# Patient Record
Sex: FEMALE | Race: WHITE | Employment: UNEMPLOYED | URBAN - METROPOLITAN AREA
[De-identification: names, ages, dates, MRNs, and addresses within clinical notes are randomized per-mention and may not be internally consistent; named-entity substitution may affect disease eponyms.]

---

## 2017-06-03 ENCOUNTER — HOSPITAL ENCOUNTER (EMERGENCY)
Age: 37
Discharge: HOME | End: 2017-06-03
Attending: EMERGENCY MEDICINE
Payer: MEDICAID

## 2017-06-03 VITALS
OXYGEN SATURATION: 99 % | RESPIRATION RATE: 14 BRPM | BODY MASS INDEX: 47.12 KG/M2 | TEMPERATURE: 98.7 F | HEART RATE: 66 BPM | WEIGHT: 240 LBS | HEIGHT: 60 IN | SYSTOLIC BLOOD PRESSURE: 138 MMHG | DIASTOLIC BLOOD PRESSURE: 60 MMHG

## 2017-06-03 DIAGNOSIS — S03.2XXA AVULSED TOOTH, INITIAL ENCOUNTER: Primary | ICD-10-CM

## 2017-06-03 PROCEDURE — 99283 EMERGENCY DEPT VISIT LOW MDM: CPT

## 2017-06-03 PROCEDURE — 74011250637 HC RX REV CODE- 250/637: Performed by: PHYSICIAN ASSISTANT

## 2017-06-03 PROCEDURE — 74011000250 HC RX REV CODE- 250: Performed by: PHYSICIAN ASSISTANT

## 2017-06-03 RX ORDER — TRAMADOL HYDROCHLORIDE 50 MG/1
50 TABLET ORAL
Qty: 8 TAB | Refills: 0 | Status: SHIPPED | OUTPATIENT
Start: 2017-06-03 | End: 2017-08-01

## 2017-06-03 RX ORDER — PENICILLIN V POTASSIUM 500 MG/1
500 TABLET, FILM COATED ORAL 4 TIMES DAILY
Qty: 40 TAB | Refills: 0 | Status: SHIPPED | OUTPATIENT
Start: 2017-06-03 | End: 2017-06-13

## 2017-06-03 RX ORDER — PENICILLIN V POTASSIUM 500 MG/1
500 TABLET, FILM COATED ORAL 4 TIMES DAILY
Qty: 40 TAB | Refills: 0 | Status: SHIPPED | OUTPATIENT
Start: 2017-06-03 | End: 2017-06-03

## 2017-06-03 RX ADMIN — LIDOCAINE HYDROCHLORIDE: 20 SOLUTION ORAL; TOPICAL at 21:43

## 2017-06-04 NOTE — ED TRIAGE NOTES
Pt arrived to ED with c/o dental pain since last night. Pt was at a family cookout when she bit into a \"bone\" and \"broke\" her tooth. Pt is alert and orientated X 4; skin is intact; lungs are clear; pt breaths well on room air; Pt is in no acute distress. Will continue to monitor.

## 2017-06-04 NOTE — DISCHARGE INSTRUCTIONS
Broken Tooth: Care Instructions  Your Care Instructions  A tooth can be chipped, broken, or knocked out during sports, an accident, or a bad fall. Your doctor may have fixed your tooth temporarily. You also may have been given pain medicine. If you had signs of infection, you may need to take antibiotics. You will need to see a dentist. If you have chipped a tooth, it may be jagged, which can irritate your mouth and tongue. The dentist may smooth the edges and fill in the part that chipped off. A permanent tooth that has been knocked out can be put back in (reimplanted) if it is done quickly. The dentist may need to put a crown on a broken tooth to cover the tooth and hold it together. Prompt dental treatment can often prevent infection in the tooth. Follow-up care is a key part of your treatment and safety. Be sure to make and go to all appointments, and call your doctor if you are having problems. It's also a good idea to know your test results and keep a list of the medicines you take. How can you care for yourself at home? · If your tooth pulp is exposed, you can protect it by putting temporary filling material over the broken area. You can buy temporary filling mixes in drugstores. Follow the directions on the label. · To relieve pain and swelling, put ice or a cold cloth on the tooth's gum or cheek area, or suck on a piece of ice. But if the tooth's nerve or pulp is exposed, avoid putting anything too hot or cold near the tooth until you see your dentist.  · Ask your doctor if you can take an over-the-counter pain medicine, such as acetaminophen (Tylenol), ibuprofen (Advil, Motrin), or naproxen (Aleve). Be safe with medicines. Read and follow all instructions on the label. · If your doctor prescribed antibiotics, take them as directed. Do not stop taking them just because you feel better. You need to take the full course of antibiotics.   · To help healing, rinse your mouth with warm salt water right after meals. To make a saltwater solution, mix 1 teaspoon of salt in 1 cup of warm water. · Eat soft foods that are easy to chew. · Avoid foods that might sting, such as salty or spicy foods, citrus fruits, and tomatoes. · Do not smoke or use spit tobacco. Tobacco can slow healing in your mouth. If you need help quitting, talk to your doctor about stop-smoking programs and medicines. These can increase your chances of quitting for good. · If your tooth is loose, be gentle when you brush or floss. But be sure to brush your teeth at least two times a day, and floss at least once a day. When should you call for help? Call your doctor now or seek immediate medical care if:  · You have signs of infection, such as:  ¨ Increased pain or swelling in your mouth. ¨ Red streaks leading from the gum tissue around the tooth. ¨ Pus draining from the area around the tooth. ¨ A fever. · You have pain and swelling after chipping or breaking a tooth. · You have bleeding near a tooth. · You are not able to open or close your mouth normally. Watch closely for changes in your health, and be sure to contact your doctor if:  · Your tooth is sensitive to heat, cold, air, or sweets. · You do not get better as expected. Where can you learn more? Go to http://cherise-nir.info/. Enter W162 in the search box to learn more about \"Broken Tooth: Care Instructions. \"  Current as of: August 9, 2016  Content Version: 11.2  © 6521-1547 [employer]. Care instructions adapted under license by [employer] (which disclaims liability or warranty for this information). If you have questions about a medical condition or this instruction, always ask your healthcare professional. [place] [address] any warranty or liability for your use of this information.

## 2017-06-04 NOTE — ED PROVIDER NOTES
Patient is a 39 y.o. female presenting with dental problem. The history is provided by the patient. Dental Pain    This is a new problem. Episode onset: Pt reports she bit into a bone last night and her tooth broke. Pt reports increased pain since. Denies fever/chills, trouble swallowing. The pain is located in the left lower mouth. The pain is at a severity of 10/10. The pain is severe. There was no vomiting, no nausea, no fever, no swelling, no chest pain, no shortness of breath, no headaches, no gum redness and no drainage. She has tried nothing for the symptoms. The patient has no cardiac history. 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    Irregular menses        Past Surgical History:   Procedure Laterality Date    HX GYN      HX LAP CHOLECYSTECTOMY      HX ORTHOPAEDIC      HX TONSILLECTOMY           Family History:   Problem Relation Age of Onset    Heart Attack Mother     Stroke Mother     Hypertension Sister     High Cholesterol Sister     Diabetes Sister     Migraines Sister     Cancer Paternal Grandfather     HIV/AIDS Neg Hx        Social History     Social History    Marital status: SINGLE     Spouse name: N/A    Number of children: N/A    Years of education: N/A     Occupational History    Not on file. Social History Main Topics    Smoking status: Former Smoker     Years: 2.00     Quit date: 2/16/2007    Smokeless tobacco: Never Used    Alcohol use No    Drug use: No    Sexual activity: Yes     Partners: Male     Other Topics Concern    Not on file     Social History Narrative         ALLERGIES: Review of patient's allergies indicates no known allergies. Review of Systems   Constitutional: Negative for chills and fever. HENT: Positive for dental problem.  Negative for congestion, ear pain, facial swelling, rhinorrhea, sinus pressure, sore throat and trouble swallowing. Eyes: Negative for photophobia and visual disturbance. Respiratory: Negative for cough and shortness of breath. Cardiovascular: Negative for chest pain. Gastrointestinal: Negative for abdominal pain, nausea and vomiting. Genitourinary: Negative for flank pain. Musculoskeletal: Negative for back pain and myalgias. Skin: Negative for color change, pallor, rash and wound. Neurological: Negative for dizziness, syncope, weakness, light-headedness and headaches. All other systems reviewed and are negative. Vitals:    06/03/17 2106   BP: 138/60   Pulse: 66   Resp: 14   Temp: 98.7 °F (37.1 °C)   SpO2: 99%   Weight: 108.9 kg (240 lb)   Height: 5' (1.524 m)            Physical Exam   Constitutional: She is oriented to person, place, and time. She appears well-developed and well-nourished. No distress. HENT:   Head: Normocephalic and atraumatic. Right Ear: Tympanic membrane, external ear and ear canal normal.   Left Ear: Tympanic membrane, external ear and ear canal normal.   Nose: No mucosal edema or rhinorrhea. Mouth/Throat: Uvula is midline, oropharynx is clear and moist and mucous membranes are normal. No trismus in the jaw. Abnormal dentition. Dental caries present. No dental abscesses or uvula swelling. No oropharyngeal exudate, posterior oropharyngeal edema, posterior oropharyngeal erythema or tonsillar abscesses. Eyes: Conjunctivae are normal.   Cardiovascular: Normal rate, regular rhythm and normal heart sounds. Pulmonary/Chest: Effort normal and breath sounds normal. No respiratory distress. Abdominal: Soft. Bowel sounds are normal. She exhibits no distension. Musculoskeletal: Normal range of motion. Neurological: She is alert and oriented to person, place, and time. Skin: Skin is warm. No rash noted. Psychiatric: She has a normal mood and affect. Her behavior is normal.   Nursing note and vitals reviewed.        MDM  Number of Diagnoses or Management Options  Avulsed tooth, initial encounter:   Diagnosis management comments: DDx: Avulsed Tooth, Dental Pain vs Infection vs Abscess    ED Course       Procedures             LABORATORY TESTS:  No results found for this or any previous visit (from the past 12 hour(s)). IMAGING RESULTS:  No orders to display       MEDICATIONS GIVEN:  Medications   dental ball (lidocaine/Benadryl/Cetacaine) mixture (not administered)       IMPRESSION:  1. Avulsed tooth, initial encounter        PLAN:  1. Current Discharge Medication List      START taking these medications    Details   penicillin v potassium (VEETID) 500 mg tablet Take 1 Tab by mouth four (4) times daily for 10 days. Qty: 40 Tab, Refills: 0      traMADol (ULTRAM) 50 mg tablet Take 1 Tab by mouth every six (6) hours as needed for Pain. Max Daily Amount: 200 mg. Qty: 8 Tab, Refills: 0           2.    Follow-up Information     Follow up With Details Comments [id number] Mike Saunders MD Schedule an appointment as soon as possible for a visit in 2 days As needed [address] [place] [address]  [phone]          Return to ED if worse

## 2017-06-04 NOTE — ED NOTES

## 2017-08-01 ENCOUNTER — HOSPITAL ENCOUNTER (OUTPATIENT)
Dept: GENERAL RADIOLOGY | Age: 37
Discharge: HOME | End: 2017-08-01
Payer: MEDICAID

## 2017-08-01 ENCOUNTER — OFFICE VISIT (OUTPATIENT)
Dept: FAMILY MEDICINE CLINIC | Age: 37
End: 2017-08-01

## 2017-08-01 VITALS
SYSTOLIC BLOOD PRESSURE: 123 MMHG | RESPIRATION RATE: 20 BRPM | TEMPERATURE: 98.3 F | BODY MASS INDEX: 48.33 KG/M2 | DIASTOLIC BLOOD PRESSURE: 62 MMHG | HEART RATE: 66 BPM | HEIGHT: 60 IN | WEIGHT: 246.2 LBS

## 2017-08-01 DIAGNOSIS — M54.42 ACUTE BILATERAL LOW BACK PAIN WITH BILATERAL SCIATICA: Primary | ICD-10-CM

## 2017-08-01 DIAGNOSIS — M54.41 ACUTE BILATERAL LOW BACK PAIN WITH BILATERAL SCIATICA: ICD-10-CM

## 2017-08-01 DIAGNOSIS — F33.1 DEPRESSION, MAJOR, RECURRENT, MODERATE (HCC): ICD-10-CM

## 2017-08-01 DIAGNOSIS — L65.9 ALOPECIA: ICD-10-CM

## 2017-08-01 DIAGNOSIS — F43.9 STRESS: ICD-10-CM

## 2017-08-01 DIAGNOSIS — M54.42 ACUTE BILATERAL LOW BACK PAIN WITH BILATERAL SCIATICA: ICD-10-CM

## 2017-08-01 DIAGNOSIS — G43.009 MIGRAINE WITHOUT AURA AND WITHOUT STATUS MIGRAINOSUS, NOT INTRACTABLE: ICD-10-CM

## 2017-08-01 DIAGNOSIS — M54.41 ACUTE BILATERAL LOW BACK PAIN WITH BILATERAL SCIATICA: Primary | ICD-10-CM

## 2017-08-01 DIAGNOSIS — F41.9 ANXIETY: ICD-10-CM

## 2017-08-01 PROCEDURE — 72100 X-RAY EXAM L-S SPINE 2/3 VWS: CPT

## 2017-08-01 RX ORDER — METHOCARBAMOL 500 MG/1
500 TABLET, FILM COATED ORAL
Qty: 40 TAB | Refills: 1 | OUTPATIENT
Start: 2017-08-01 | End: 2019-10-22

## 2017-08-01 RX ORDER — NAPROXEN 500 MG/1
500 TABLET ORAL 2 TIMES DAILY WITH MEALS
Qty: 40 TAB | Refills: 0 | OUTPATIENT
Start: 2017-08-01 | End: 2019-10-22

## 2017-08-01 RX ORDER — DULOXETIN HYDROCHLORIDE 30 MG/1
CAPSULE, DELAYED RELEASE ORAL
Qty: 60 CAP | Refills: 0 | Status: SHIPPED | OUTPATIENT
Start: 2017-08-01 | End: 2020-01-09 | Stop reason: ALTCHOICE

## 2017-08-01 NOTE — PROGRESS NOTES
HISTORY OF PRESENT ILLNESS  Adelso Thomas is a 39 y.o. female. Headache    The history is provided by the patient (she was told she has migraines 4-5 years ago). This is a chronic problem. Episode onset: about a year ago. Episode frequency: daily. The headache is aggravated by photophobia, loud noise, visual complaints and nausea (seeing sparkles). The pain is located in the right unilateral region. The quality of the pain is described as sharp and throbbing (stabbing). Pain scale: 4/10 - 10/10. Associated symptoms include visual change and nausea. Pertinent negatives include no fever, no weakness, no tingling and no vomiting. She has tried NSAIDs for the symptoms. The treatment provided mild relief. Back Pain    The history is provided by the patient (no history of trauma). This is a new problem. Episode onset: about 2 weeks ago. The problem has been gradually worsening. The problem occurs constantly. The pain is associated with no known injury. The pain is present in the lower back and left side. The quality of the pain is described as aching (throbbing). The pain radiates to the left foot and right foot. Exacerbated by: putting pressure on the area. Associated symptoms include headaches. Pertinent negatives include no fever, no numbness, no bowel incontinence, no bladder incontinence, no tingling and no weakness. She has tried NSAIDs and heat for the symptoms. The treatment provided no relief. Risk factors include obesity, lack of exercise and a sedentary lifestyle. The patient's surgical history non-contributory   Alopecia   The history is provided by the patient (she has been stressed. She is breaking up with her , losing her home, having to go live with her family in [place]). This is a new problem. Episode onset: about 2 weeks ago. Associated symptoms include headaches. Review of Systems   Constitutional: Negative for chills and fever.    HENT:        Phonophobia   Eyes: Positive for blurred vision and photophobia. Negative for double vision. Gastrointestinal: Positive for nausea. Negative for bowel incontinence and vomiting. No bowel incontinence   Genitourinary: Negative for bladder incontinence. No bladder incontinence   Musculoskeletal: Positive for back pain. Negative for falls. Neurological: Positive for headaches. Negative for tingling, sensory change, speech change, focal weakness, weakness and numbness. Psychiatric/Behavioral: Positive for depression. Negative for substance abuse and suicidal ideas. The patient is nervous/anxious and has insomnia. Visit Vitals    /62 (BP 1 Location: Left arm, BP Patient Position: Sitting)    Pulse 66    Temp 98.3 °F (36.8 °C) (Oral)    Resp 20    Ht 5' (1.524 m)    Wt 246 lb 3.2 oz (111.7 kg)    LMP 05/29/2017 (Approximate)    BMI 48.08 kg/m2     Physical Exam   Constitutional: She is oriented to person, place, and time. She appears well-developed and well-nourished. No distress. Eyes: Conjunctivae and EOM are normal. Pupils are equal, round, and reactive to light. Fundoscopic exam:       The right eye shows no papilledema. The left eye shows no papilledema. Cardiovascular: Normal rate, regular rhythm and normal heart sounds. Exam reveals no gallop and no friction rub. No murmur heard. Pulmonary/Chest: Effort normal and breath sounds normal. No respiratory distress. She has no wheezes. She has no rales. Musculoskeletal:        Lumbar back: She exhibits tenderness, bony tenderness and pain. She exhibits normal range of motion and no spasm. The entire low back and buttocks regions are tender. There is tenderness over L1 - L4. Neurological: She is alert and oriented to person, place, and time. She has normal strength. No cranial nerve deficit or sensory deficit. Coordination and gait normal. She displays no Babinski's sign on the right side. She displays no Babinski's sign on the left side.    Reflex Scores:       Tricep reflexes are 2+ on the right side and 2+ on the left side. Bicep reflexes are 2+ on the right side and 2+ on the left side. Brachioradialis reflexes are 2+ on the right side and 2+ on the left side. Patellar reflexes are 2+ on the right side and 2+ on the left side. Achilles reflexes are 2+ on the right side and 2+ on the left side. Negative SLRs   Skin: Skin is warm and dry. She is not diaphoretic. Psychiatric: Her speech is normal. Thought content normal. She exhibits a depressed mood. tearful       ASSESSMENT and PLAN    ICD-10-CM ICD-9-CM    1. Acute bilateral low back pain with bilateral sciatica M54.42 724.2 naproxen (NAPROSYN) 500 mg tablet    M54.41 724.3 methocarbamol (ROBAXIN) 500 mg tablet      CANCELED: XR SPINE LUMB MIN 4 V   2. Migraine without aura and without status migrainosus, not intractable G43.009 346.10    3. Stress F43.9 V62.89    4. Depression, major, recurrent, moderate (HCC) F33.1 296.32 DULoxetine (CYMBALTA) 30 mg capsule   5. Anxiety F41.9 300.00 DULoxetine (CYMBALTA) 30 mg capsule   6. Alopecia L65.9 704.00         Likely back strain  Migraine, likely related to depression, anxiety and stress  Stress related alopecia  Motrin, Robaxin, heat, rest prn back pain  L spine x-ray  Start Cymbalta    Follow-up Disposition:  Return in about 1 month (around 9/1/2017) for migraines, depression, anxiety. Reviewed plan of care. Patient has provided input and agrees with goals.

## 2017-08-01 NOTE — MR AVS SNAPSHOT
Visit Information Date & Time Provider Department Dept. Phone Encounter #  
 8/1/2017  2:00 PM Shell DowneyButch 34 [id number] Follow-up Instructions Return in about 1 month (around 9/1/2017) for migraines, depression, anxiety. Upcoming Health Maintenance Date Due DTaP/Tdap/Td series (1 - Tdap) 8/23/2000 PAP AKA CERVICAL CYTOLOGY 8/23/2000 INFLUENZA AGE 9 TO ADULT 8/1/2017 Allergies as of 8/1/2017  Review Complete On: 8/1/2017 By: Shell Downey MD  
 No Known Allergies Current Immunizations  Never Reviewed No immunizations on file. Not reviewed this visit You Were Diagnosed With   
  
 Codes Comments Acute bilateral low back pain with bilateral sciatica    -  Primary ICD-10-CM: M54.42, M54.41 
ICD-9-CM: 724.2, 724.3 Migraine without aura and without status migrainosus, not intractable     ICD-10-CM: Z11.307 ICD-9-CM: 346.10 Stress     ICD-10-CM: F43.9 ICD-9-CM: V62.89 Depression, major, recurrent, moderate ([facility] [address]     ICD-10-CM: F33.1 ICD-9-CM: 296.32 Anxiety     ICD-10-CM: F41.9 ICD-9-CM: 300.00 Alopecia     ICD-10-CM: L65.9 ICD-9-CM: 704.00 Vitals BP Pulse Temp Resp Height(growth percentile) Weight(growth percentile) 123/62 (BP 1 Location: Left arm, BP Patient Position: Sitting) 66 98.3 °F (36.8 °C) (Oral) 20 5' (1.524 m) 246 lb 3.2 oz (111.7 kg) LMP BMI OB Status Smoking Status 05/29/2017 (Approximate) 48.08 kg/m2 Unknown Former Smoker Vitals History BMI and BSA Data Body Mass Index Body Surface Area 48.08 kg/m 2 2.17 m 2 Preferred Pharmacy Pharmacy Name Phone [id number] [phone] Your Updated Medication List  
  
   
This list is accurate as of: 8/1/17  2:43 PM.  Always use your most recent med list.  
  
  
  
  
 DULoxetine 30 mg capsule Commonly known as:  CYMBALTA One PO every day for 7 days, then 2 PO QD  
  
 methocarbamol 500 mg tablet Commonly known as:  ROBAXIN Take 1 Tab by mouth four (4) times daily as needed (pain). naproxen 500 mg tablet Commonly known as:  NAPROSYN Take 1 Tab by mouth two (2) times daily (with meals). Take with food or milk Prescriptions Sent to Pharmacy Refills  
 naproxen (NAPROSYN) 500 mg tablet 0 Sig: Take 1 Tab by mouth two (2) times daily (with meals). Take with food or milk Class: Normal  
 Pharm[id number] Ph #: [phone] Route: Oral  
 methocarbamol (ROBAXIN) 500 mg tablet 1 Sig: Take 1 Tab by mouth four (4) times daily as needed (pain). Class: Normal  
 Pharm[id number] [address] Ph #: [phone] Route: Oral  
 DULoxetine (CYMBALTA) 30 mg capsule 0 Sig: One PO every day for 7 days, then 2 PO QD Class: Normal  
 [id number] [address] Ph #: [phone] Follow-up Instructions Return in about 1 month (around 9/1/2017) for migraines, depression, anxiety. To-Do List   
 08/01/2017 Imaging:  XR SPINE LUMB MIN 4 V Introducing [facility]! [facility] introduces [employer] patient portal. Now you can access parts of your medical record, email your doctor's office, and request medication refills online. 1. In your internet browser, go to [email or web address] 2. Click on the First Time User? Click Here link in the Sign In box. You will see the New Member Sign Up page. 3. Enter your [employer] Access Code exactly as it appears below. You will not need to use this code after youve completed the sign-up process. If you do not sign up before the expiration date, you must request a new code. · [employer] Access Code: [email or web address] Expires: 9/1/2017  9:41 PM 
 
4. Enter the last four digits of your Social Security Number (xxxx) and Date of Birth (mm/dd/yyyy) as indicated and click Submit. You will be taken to the next sign-up page. 5. Create a [employer] ID. This will be your [employer] login ID and cannot be changed, so think of one that is secure and easy to remember. 6. Create a [employer] password. You can change your password at any time. 7. Enter your Password Reset Question and Answer. This can be used at a later time if you forget your password. 8. Enter your e-mail address. You will receive e-mail notification when new information is available in [address] 9. Click Sign Up. You can now view and download portions of your medical record. 10. Click the Download Summary menu link to download a portable copy of your medical information. If you have questions, please visit the Frequently Asked Questions section of the [employer] website. Remember, [employer] is NOT to be used for urgent needs. For medical emergencies, dial 911. Now available from your iPhone and Android! Please provide this summary of care documentation to your next provider. Your primary care clinician is listed as Leeanne Gutierrez. If you have any questions after today's visit, please call [phone].

## 2017-08-01 NOTE — PROGRESS NOTES
Chief Complaint   Patient presents with    Headache     frequently but not presently.  Back Pain     lower back pain with radiating pain down legs, left leg hurts today-5/10.  Alopecia     1. Have you been to the ER, urgent care clinic since your last visit? Yes,  [facility] ED two months ago for facial swelling. Hospitalized since your last visit? No    2. Have you seen or consulted any other health care providers outside of the [address] [place] [address] since your last visit? Include any pap smears or colon screening.  No

## 2017-08-10 ENCOUNTER — TELEPHONE (OUTPATIENT)
Dept: FAMILY MEDICINE CLINIC | Age: 37
End: 2017-08-10

## 2017-08-10 NOTE — TELEPHONE ENCOUNTER
Pt called for explanation of her back x ray results, was advised of Dr. Vargas Gather note on letter sent, agrees to take the medication Dr. Estefania Funes prescribed for her symptoms, noting hip and back pain, and will call back if symptoms worsen or don't improve.   Francisco

## 2018-09-28 NOTE — ED NOTES
Emergency Department Nursing Plan of Care       The Nursing Plan of Care is developed from the Nursing assessment and Emergency Department Attending provider initial evaluation. The plan of care may be reviewed in the ED Provider note.     The Plan of Care was developed with the following considerations:   Patient / Family readiness to learn indicated by:verbalized understanding  Persons(s) to be included in education: patient  Barriers to Learning/Limitations:No    Signed     Josiah Boyd RN    6/3/2017   9:37 PM (4) no limitation

## 2019-10-22 ENCOUNTER — HOSPITAL ENCOUNTER (EMERGENCY)
Age: 39
Discharge: HOME | End: 2019-10-22
Attending: EMERGENCY MEDICINE
Payer: MEDICAID

## 2019-10-22 VITALS
HEART RATE: 74 BPM | BODY MASS INDEX: 55.49 KG/M2 | SYSTOLIC BLOOD PRESSURE: 132 MMHG | HEIGHT: 60 IN | DIASTOLIC BLOOD PRESSURE: 71 MMHG | OXYGEN SATURATION: 98 % | WEIGHT: 282.63 LBS | TEMPERATURE: 98.3 F | RESPIRATION RATE: 16 BRPM

## 2019-10-22 DIAGNOSIS — M54.50 ACUTE LEFT-SIDED LOW BACK PAIN WITHOUT SCIATICA: Primary | ICD-10-CM

## 2019-10-22 DIAGNOSIS — J06.9 ACUTE URI: ICD-10-CM

## 2019-10-22 LAB
APPEARANCE UR: ABNORMAL
BACTERIA URNS QL MICRO: ABNORMAL /HPF
BILIRUB UR QL: NEGATIVE
COLOR UR: ABNORMAL
EPITH CASTS URNS QL MICRO: ABNORMAL /LPF
GLUCOSE UR STRIP.AUTO-MCNC: NEGATIVE MG/DL
HCG UR QL: NEGATIVE
HGB UR QL STRIP: NEGATIVE
KETONES UR QL STRIP.AUTO: NEGATIVE MG/DL
LEUKOCYTE ESTERASE UR QL STRIP.AUTO: ABNORMAL
MUCOUS THREADS URNS QL MICRO: ABNORMAL /LPF
NITRITE UR QL STRIP.AUTO: NEGATIVE
PH UR STRIP: 6.5 [PH] (ref 5–8)
PROT UR STRIP-MCNC: NEGATIVE MG/DL
RBC #/AREA URNS HPF: ABNORMAL /HPF (ref 0–5)
SP GR UR REFRACTOMETRY: 1.02 (ref 1–1.03)
UR CULT HOLD, URHOLD: NORMAL
UROBILINOGEN UR QL STRIP.AUTO: 0.2 EU/DL (ref 0.2–1)
WBC URNS QL MICRO: ABNORMAL /HPF (ref 0–4)

## 2019-10-22 PROCEDURE — 99284 EMERGENCY DEPT VISIT MOD MDM: CPT

## 2019-10-22 PROCEDURE — 81025 URINE PREGNANCY TEST: CPT

## 2019-10-22 PROCEDURE — 96372 THER/PROPH/DIAG INJ SC/IM: CPT

## 2019-10-22 PROCEDURE — 74011250636 HC RX REV CODE- 250/636: Performed by: PHYSICIAN ASSISTANT

## 2019-10-22 PROCEDURE — 81001 URINALYSIS AUTO W/SCOPE: CPT

## 2019-10-22 RX ORDER — METHOCARBAMOL 750 MG/1
750 TABLET, FILM COATED ORAL 4 TIMES DAILY
Qty: 20 TAB | Refills: 0 | Status: SHIPPED | OUTPATIENT
Start: 2019-10-22 | End: 2020-01-09 | Stop reason: ALTCHOICE

## 2019-10-22 RX ORDER — LIDOCAINE 4 G/100G
PATCH TOPICAL
Qty: 30 PATCH | Refills: 0 | Status: SHIPPED | OUTPATIENT
Start: 2019-10-22 | End: 2020-01-09 | Stop reason: ALTCHOICE

## 2019-10-22 RX ORDER — ETODOLAC 400 MG/1
400 TABLET, FILM COATED ORAL 2 TIMES DAILY WITH MEALS
Qty: 20 TAB | Refills: 0 | Status: SHIPPED | OUTPATIENT
Start: 2019-10-22 | End: 2020-01-09 | Stop reason: ALTCHOICE

## 2019-10-22 RX ORDER — KETOROLAC TROMETHAMINE 30 MG/ML
60 INJECTION, SOLUTION INTRAMUSCULAR; INTRAVENOUS ONCE
Status: COMPLETED | OUTPATIENT
Start: 2019-10-22 | End: 2019-10-22

## 2019-10-22 RX ORDER — FLUTICASONE PROPIONATE 50 MCG
2 SPRAY, SUSPENSION (ML) NASAL DAILY
Qty: 1 BOTTLE | Refills: 0 | Status: SHIPPED | OUTPATIENT
Start: 2019-10-22 | End: 2020-01-09 | Stop reason: ALTCHOICE

## 2019-10-22 RX ADMIN — KETOROLAC TROMETHAMINE 60 MG: 30 INJECTION, SOLUTION INTRAMUSCULAR at 18:28

## 2019-10-22 NOTE — LETTER
[facility] EMERGENCY DEPT 
[address] [facility] [place] [address] 
[phone] Work/School Note Date: 10/22/2019 To Whom It May concern: 
 
Noelle Gloria was seen and treated today in the emergency room by the following provider(s): 
Attending Provider: Neda Dumont MD 
Physician Assistant: ISAIAS Ulrich. Noelle Gloria may return to work on 10/25/19.  
 
Sincerely, 
 
 
 
 
ISAIAS Malave

## 2019-10-22 NOTE — ED TRIAGE NOTES
Pt ambulatory to room. Cough for about a week. Now left sided low back pain.   Tender to touch, worse with cough

## 2019-10-22 NOTE — DISCHARGE INSTRUCTIONS
Rest, ice to areas that hurt. Gentle stretch. Rest, push clear liquids, salt water nose sprays, salt water gargles. Motrin and tylenol for pain and fever. Learning About Relief for Back Pain  What is back tension and strain? Back strain happens when you overstretch, or pull, a muscle in your back. You may hurt your back in an accident or when you exercise or lift something. Most back pain will get better with rest and time. You can take care of yourself at home to help your back heal.  What can you do first to relieve back pain? When you first feel back pain, try these steps:  · Walk. Take a short walk (10 to 20 minutes) on a level surface (no slopes, hills, or stairs) every 2 to 3 hours. Walk only distances you can manage without pain, especially leg pain. · Relax. Find a comfortable position for rest. Some people are comfortable on the floor or a medium-firm bed with a small pillow under their head and another under their knees. Some people prefer to lie on their side with a pillow between their knees. Don't stay in one position for too long. · Try heat or ice. Try using a heating pad on a low or medium setting, or take a warm shower, for 15 to 20 minutes every 2 to 3 hours. Or you can buy single-use heat wraps that last up to 8 hours. You can also try an ice pack for 10 to 15 minutes every 2 to 3 hours. You can use an ice pack or a bag of frozen vegetables wrapped in a thin towel. There is not strong evidence that either heat or ice will help, but you can try them to see if they help. You may also want to try switching between heat and cold. · Take pain medicine exactly as directed. ¨ If the doctor gave you a prescription medicine for pain, take it as prescribed. ¨ If you are not taking a prescription pain medicine, ask your doctor if you can take an over-the-counter medicine. What else can you do? · Stretch and exercise.  Exercises that increase flexibility may relieve your pain and make it easier for your muscles to keep your spine in a good, neutral position. And don't forget to keep walking. · Do self-massage. You can use self-massage to unwind after work or school or to energize yourself in the morning. You can easily massage your feet, hands, or neck. Self-massage works best if you are in comfortable clothes and are sitting or lying in a comfortable position. Use oil or lotion to massage bare skin. · Reduce stress. Back pain can lead to a vicious [place]: Distress about the pain tenses the muscles in your back, which in turn causes more pain. Learn how to relax your mind and your muscles to lower your stress. Where can you learn more? Go to http://cherise-nir.info/. Enter Z250 in the search box to learn more about \"Learning About Relief for Back Pain. \"  Current as of: March 21, 2017  Content Version: 11.5  © 1386-9722 [employer]. Care instructions adapted under license by [employer] (which disclaims liability or warranty for this information). If you have questions about a medical condition or this instruction, always ask your healthcare professional. [place] [address] any warranty or liability for your use of this information. Patient Education        Upper Respiratory Infection (Cold): Care Instructions  Your Care Instructions    An upper respiratory infection, or URI, is an infection of the nose, sinuses, or throat. URIs are spread by coughs, sneezes, and direct contact. The common cold is the most frequent kind of URI. The flu and sinus infections are other kinds of URIs. Almost all URIs are caused by viruses. Antibiotics won't cure them. But you can treat most infections with home care. This may include drinking lots of fluids and taking over-the-counter pain medicine. You will probably feel better in 4 to 10 days. The doctor has checked you carefully, but problems can develop later.  If you notice any problems or new symptoms, get medical treatment right away. Follow-up care is a key part of your treatment and safety. Be sure to make and go to all appointments, and call your doctor if you are having problems. It's also a good idea to know your test results and keep a list of the medicines you take. How can you care for yourself at home? · To prevent dehydration, drink plenty of fluids, enough so that your urine is light yellow or clear like water. Choose water and other caffeine-free clear liquids until you feel better. If you have kidney, heart, or liver disease and have to limit fluids, talk with your doctor before you increase the amount of fluids you drink. · Take an over-the-counter pain medicine, such as acetaminophen (Tylenol), ibuprofen (Advil, Motrin), or naproxen (Aleve). Read and follow all instructions on the label. · Before you use cough and cold medicines, check the label. These medicines may not be safe for young children or for people with certain health problems. · Be careful when taking over-the-counter cold or flu medicines and Tylenol at the same time. Many of these medicines have acetaminophen, which is Tylenol. Read the labels to make sure that you are not taking more than the recommended dose. Too much acetaminophen (Tylenol) can be harmful. · Get plenty of rest.  · Do not smoke or allow others to smoke around you. If you need help quitting, talk to your doctor about stop-smoking programs and medicines. These can increase your chances of quitting for good. When should you call for help? Call 911 anytime you think you may need emergency care.  For example, call if:    · You have severe trouble breathing.    Call your doctor now or seek immediate medical care if:    · You seem to be getting much sicker.     · You have new or worse trouble breathing.     · You have a new or higher fever.     · You have a new rash.    Watch closely for changes in your health, and be sure to contact your doctor if:    · You have a new symptom, such as a sore throat, an earache, or sinus pain.     · You cough more deeply or more often, especially if you notice more mucus or a change in the color of your mucus.     · You do not get better as expected. Where can you learn more? Go to http://cherise-nir.info/. Enter W480 in the search box to learn more about \"Upper Respiratory Infection (Cold): Care Instructions. \"  Current as of: June 9, 2019  Content Version: 12.2  © 0798-3811 [employer]. Care instructions adapted under license by [employer] (which disclaims liability or warranty for this information). If you have questions about a medical condition or this instruction, always ask your healthcare professional. [address] any warranty or liability for your use of this information.

## 2020-01-09 ENCOUNTER — HOSPITAL ENCOUNTER (EMERGENCY)
Age: 40
Discharge: HOME | End: 2020-01-09
Attending: EMERGENCY MEDICINE | Admitting: EMERGENCY MEDICINE
Payer: MEDICAID

## 2020-01-09 VITALS
HEIGHT: 59 IN | OXYGEN SATURATION: 98 % | HEART RATE: 58 BPM | TEMPERATURE: 97.5 F | RESPIRATION RATE: 14 BRPM | SYSTOLIC BLOOD PRESSURE: 127 MMHG | BODY MASS INDEX: 55.11 KG/M2 | WEIGHT: 273.37 LBS | DIASTOLIC BLOOD PRESSURE: 56 MMHG

## 2020-01-09 DIAGNOSIS — K08.89 TOOTHACHE: Primary | ICD-10-CM

## 2020-01-09 PROCEDURE — 99282 EMERGENCY DEPT VISIT SF MDM: CPT

## 2020-01-09 RX ORDER — AMOXICILLIN 500 MG/1
500 TABLET, FILM COATED ORAL 3 TIMES DAILY
Qty: 30 TAB | Refills: 0 | Status: SHIPPED | OUTPATIENT
Start: 2020-01-09 | End: 2020-09-22

## 2020-01-09 NOTE — ED PROVIDER NOTES
HPI   The patient is a 23-year-old white female who presents to the emergency room 3 days after chewing on something hard and feeling a pop in her right rear lower molar. She has had some pain since then. She has a history of depression fibromyalgia low back pain and migraines.   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    Irregular menses     Migraine without aura and without status migrainosus, not intractable 2017       Past Surgical History:   Procedure Laterality Date    HX GYN      HX LAP CHOLECYSTECTOMY      HX ORTHOPAEDIC      HX TONSILLECTOMY           Family History:   Problem Relation Age of Onset    Heart Attack Mother     Stroke Mother     Hypertension Sister     High Cholesterol Sister     Diabetes Sister     Migraines Sister     Cancer Paternal Grandfather     HIV/AIDS Neg Hx        Social History     Socioeconomic History    Marital status: SINGLE     Spouse name: Not on file    Number of children: Not on file    Years of education: Not on file    Highest education level: Not on file   Occupational History    Not on file   Social Needs    Financial resource strain: Not on file    Food insecurity:     Worry: Not on file     Inability: Not on file    Transportation needs:     Medical: Not on file     Non-medical: Not on file   Tobacco Use    Smoking status: Former Smoker     Years: 2.00     Last attempt to quit: 2007     Years since quittin.9    Smokeless tobacco: Never Used   Substance and Sexual Activity    Alcohol use: No    Drug use: No    Sexual activity: Yes     Partners: Male   Lifestyle    Physical activity:     Days per week: Not on file     Minutes per session: Not on file    Stress: Not on file   Relationships    Social connections:     Talks on phone: Not on file     Gets together: Not on file     Attends Yazidism service: Not on file Active member of club or organization: Not on file     Attends meetings of clubs or organizations: Not on file     Relationship status: Not on file    Intimate partner violence:     Fear of current or ex partner: Not on file     Emotionally abused: Not on file     Physically abused: Not on file     Forced sexual activity: Not on file   Other Topics Concern    Not on file   Social History Narrative    Not on file         ALLERGIES: Patient has no known allergies. Review of Systems   All other systems reviewed and are negative. Vitals:    01/09/20 0956   BP: 127/56   Pulse: (!) 58   Resp: 14   Temp: 97.5 °F (36.4 °C)   SpO2: 98%   Weight: 124 kg (273 lb 5.9 oz)   Height: 4' 11\" (1.499 m)            Physical Exam  Vitals signs and nursing note reviewed. Constitutional:       Appearance: She is well-developed. HENT:      Head: Normocephalic and atraumatic. Mouth/Throat:      Pharynx: No oropharyngeal exudate. Eyes:      General: No scleral icterus. Conjunctiva/sclera: Conjunctivae normal.   Neck:      Musculoskeletal: Neck supple. Thyroid: No thyromegaly. Cardiovascular:      Rate and Rhythm: Normal rate and regular rhythm. Heart sounds: Normal heart sounds. No murmur. No friction rub. No gallop. Pulmonary:      Effort: Pulmonary effort is normal. No respiratory distress. Breath sounds: Normal breath sounds. No stridor. No wheezing or rales. Abdominal:      General: Bowel sounds are normal.      Palpations: Abdomen is soft. Tenderness: There is no tenderness. There is no guarding or rebound. Musculoskeletal: Normal range of motion. Lymphadenopathy:      Cervical: No cervical adenopathy. Skin:     General: Skin is warm and dry. Neurological:      Mental Status: She is alert and oriented to person, place, and time.      There appears to be an old crack in her right posterior lower molar that does not appear to be acute    MDM       Procedures

## 2020-01-09 NOTE — ED TRIAGE NOTES
A few days ago patient was eating mints and she thinks she cracked her right upper and lower teeth. She has had jaw pain.

## 2020-03-08 ENCOUNTER — APPOINTMENT (OUTPATIENT)
Dept: GENERAL RADIOLOGY | Age: 40
End: 2020-03-08
Attending: EMERGENCY MEDICINE
Payer: MEDICAID

## 2020-03-08 ENCOUNTER — HOSPITAL ENCOUNTER (EMERGENCY)
Age: 40
Discharge: HOME | End: 2020-03-08
Attending: EMERGENCY MEDICINE
Payer: MEDICAID

## 2020-03-08 VITALS
DIASTOLIC BLOOD PRESSURE: 66 MMHG | WEIGHT: 252 LBS | OXYGEN SATURATION: 99 % | HEIGHT: 60 IN | BODY MASS INDEX: 49.48 KG/M2 | TEMPERATURE: 98 F | SYSTOLIC BLOOD PRESSURE: 135 MMHG | RESPIRATION RATE: 16 BRPM | HEART RATE: 70 BPM

## 2020-03-08 DIAGNOSIS — L84 CALLUS: ICD-10-CM

## 2020-03-08 DIAGNOSIS — M79.671 RIGHT FOOT PAIN: Primary | ICD-10-CM

## 2020-03-08 PROCEDURE — 73630 X-RAY EXAM OF FOOT: CPT

## 2020-03-08 PROCEDURE — 99283 EMERGENCY DEPT VISIT LOW MDM: CPT

## 2020-03-08 PROCEDURE — 74011250637 HC RX REV CODE- 250/637: Performed by: EMERGENCY MEDICINE

## 2020-03-08 RX ORDER — NAPROXEN 500 MG/1
500 TABLET ORAL
Qty: 20 TAB | Refills: 0 | Status: SHIPPED | OUTPATIENT
Start: 2020-03-08 | End: 2020-09-22

## 2020-03-08 RX ORDER — NAPROXEN 250 MG/1
500 TABLET ORAL
Status: COMPLETED | OUTPATIENT
Start: 2020-03-08 | End: 2020-03-08

## 2020-03-08 RX ADMIN — NAPROXEN 500 MG: 250 TABLET ORAL at 20:32

## 2020-03-09 NOTE — ED NOTES
The patient was discharged home by Dr. Diamond Pitt and Nilesh Guillory rn in stable condition, accompanied by family. The patient is alert and oriented, is in no respiratory distress and has vital signs within normal limits . The patient's diagnosis, condition and treatment were explained to patient. The patient expressed understanding. Prescriptions given to pt. No work/school note given to pt. A discharge plan has been developed. A  was not involved in the process. Aftercare instructions were given to the patient. Pt will transport self home.

## 2020-03-09 NOTE — ED PROVIDER NOTES
HPI   36 yo F presents with right foot pain onset 2 days ago. Denies injury or trauma. Pain /10, around 1st MTP joint and sharp pains to bottom of foot. Notes intermittent swelling. No erythema or fever. No history of similar symptoms.    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    Irregular menses     Migraine without aura and without status migrainosus, not intractable 2017       Past Surgical History:   Procedure Laterality Date    HX GYN      HX LAP CHOLECYSTECTOMY      HX ORTHOPAEDIC      HX TONSILLECTOMY           Family History:   Problem Relation Age of Onset    Heart Attack Mother     Stroke Mother     Hypertension Sister     High Cholesterol Sister     Diabetes Sister     Migraines Sister     Cancer Paternal Grandfather     HIV/AIDS Neg Hx        Social History     Socioeconomic History    Marital status: SINGLE     Spouse name: Not on file    Number of children: Not on file    Years of education: Not on file    Highest education level: Not on file   Occupational History    Not on file   Social Needs    Financial resource strain: Not on file    Food insecurity:     Worry: Not on file     Inability: Not on file    Transportation needs:     Medical: Not on file     Non-medical: Not on file   Tobacco Use    Smoking status: Former Smoker     Years: 2.00     Last attempt to quit: 2007     Years since quittin.0    Smokeless tobacco: Never Used   Substance and Sexual Activity    Alcohol use: No    Drug use: No    Sexual activity: Yes     Partners: Male   Lifestyle    Physical activity:     Days per week: Not on file     Minutes per session: Not on file    Stress: Not on file   Relationships    Social connections:     Talks on phone: Not on file     Gets together: Not on file     Attends Latter day service: Not on file     Active member of club or organization: Not on file     Attends meetings of clubs or organizations: Not on file     Relationship status: Not on file    Intimate partner violence:     Fear of current or ex partner: Not on file     Emotionally abused: Not on file     Physically abused: Not on file     Forced sexual activity: Not on file   Other Topics Concern    Not on file   Social History Narrative    Not on file         ALLERGIES: Patient has no known allergies. Review of Systems   Constitutional: Negative for chills and fever. Respiratory: Negative for cough and shortness of breath. Cardiovascular: Negative for chest pain. Gastrointestinal: Negative for nausea and vomiting. Musculoskeletal: Positive for arthralgias. Skin: Negative for rash. Neurological: Negative for weakness and numbness. All other systems reviewed and are negative.       Vitals:    03/08/20 2011   BP: 154/66   Pulse: 73   Resp: 18   Temp: 98 °F (36.7 °C)   SpO2: 100%   Weight: 114.3 kg (252 lb)   Height: 5' (1.524 m)            Physical Exam   Physical Examination: General appearance - alert, well appearing, and in no distress, oriented to person, place, and time and normal appearing weight  Eyes - pupils equal and reactive, extraocular eye movements intact  Neck - supple, no significant adenopathy  Chest - clear to auscultation, no wheezes, rales or rhonchi, symmetric air entry  Heart - normal rate, regular rhythm, normal S1, S2, no murmurs, rubs, clicks or gallops  Abdomen - soft, nontender, nondistended, no masses or organomegaly  Back exam - full range of motion, no tenderness, palpable spasm or pain on motion  Neurological - alert, oriented, normal speech, no focal findings or movement disorder noted  Musculoskeletal - no joint tenderness, deformity or swelling  Extremities - peripheral pulses normal, no pedal edema, no clubbing or cyanosis, tender callus to plantar aspect of right foot at base of great toe, no erythema or warmth, NVI with strong pedal pulses and cap refill <2 sec  Skin - normal coloration and turgor, no rashes, no suspicious skin lesions noted  MDM  Number of Diagnoses or Management Options  Callus:   Right foot pain:      Amount and/or Complexity of Data Reviewed  Tests in the radiology section of CPT®: ordered and reviewed  Decide to obtain previous medical records or to obtain history from someone other than the patient: yes  Review and summarize past medical records: yes  Independent visualization of images, tracings, or specimens: yes    Patient Progress  Patient progress: stable         Procedures    No acute process on xray. F/u with podiatry/pcp. NSAIDS prn pain.

## 2020-03-09 NOTE — DISCHARGE INSTRUCTIONS
Patient Education        Foot Pain: Care Instructions  Your Care Instructions  Foot injuries that cause pain and swelling are fairly common. Almost all sports or home repair projects can cause a misstep that ends up as foot pain. Normal wear and tear, especially as you get older, also can cause foot pain. Most minor foot injuries will heal on their own, and home treatment is usually all you need to do. If you have a severe injury, you may need tests and treatment. Follow-up care is a key part of your treatment and safety. Be sure to make and go to all appointments, and call your doctor if you are having problems. It's also a good idea to know your test results and keep a list of the medicines you take. How can you care for yourself at home? · Take pain medicines exactly as directed. ? If the doctor gave you a prescription medicine for pain, take it as prescribed. ? If you are not taking a prescription pain medicine, ask your doctor if you can take an over-the-counter medicine. · Rest and protect your foot. Take a break from any activity that may cause pain. · Put ice or a cold pack on your foot for 10 to 20 minutes at a time. Put a thin cloth between the ice and your skin. · Prop up the sore foot on a pillow when you ice it or anytime you sit or lie down during the next 3 days. Try to keep it above the level of your heart. This will help reduce swelling. · Your doctor may recommend that you wrap your foot with an elastic bandage. Keep your foot wrapped for as long as your doctor advises. · If your doctor recommends crutches, use them as directed. · Wear roomy footwear. · As soon as pain and swelling end, begin gentle exercises of your foot. Your doctor can tell you which exercises will help. When should you call for help? Call 911 anytime you think you may need emergency care.  For example, call if:    · Your foot turns pale, white, blue, or cold.    Call your doctor now or seek immediate medical care if:    · You cannot move or stand on your foot.     · Your foot looks twisted or out of its normal position.     · Your foot is not stable when you step down.     · You have signs of infection, such as:  ? Increased pain, swelling, warmth, or redness. ? Red streaks leading from the sore area. ? Pus draining from a place on your foot. ? A fever.     · Your foot is numb or tingly.    Watch closely for changes in your health, and be sure to contact your doctor if:    · You do not get better as expected.     · You have bruises from an injury that last longer than 2 weeks. Where can you learn more? Go to http://cherise-nir.info/. Enter C818 in the search box to learn more about \"Foot Pain: Care Instructions. \"  Current as of: June 26, 2019  Content Version: 12.2  © 8479-8742 [employer]. Care instructions adapted under license by [employer] (which disclaims liability or warranty for this information). If you have questions about a medical condition or this instruction, always ask your healthcare professional. [place] [address] any warranty or liability for your use of this information. Patient Education        Corns and Calluses: Care Instructions  Your Care Instructions  Corns and calluses are areas of thick, hard, dead skin. They form to protect your skin from injury. Corns usually form where toes rub together. Calluses often form on the hands or feet. They may form wherever the skin rubs against something, such as shoes. In most cases, you can take steps at home to care for a corn or callus. Follow-up care is a key part of your treatment and safety. Be sure to make and go to all appointments, and call your doctor if you are having problems. It's also a good idea to know your test results and keep a list of the medicines you take. How can you care for yourself at home? · Wear shoes and other footwear that fit correctly and are roomy.  This will reduce rubbing and give corns or calluses time to heal.  · Use protective pads, such as moleskin, to cushion the callus or corn. · Soften the corn or callus and remove the dead skin by using over-the-counter products such as salicylic acid. If you have a condition that causes problems with blood flow (such as peripheral vascular disease) or loss of feeling in your feet (such as diabetes), talk to your doctor before you try any home treatment. · Soak your corn or callus in warm water, and then use a pumice stone to rub dead skin away. · Wash your feet regularly, and rub lotion into your feet while they are still moist. Dry skin can cause a callus to crack and bleed. · Never cut the corn or callus yourself, especially if you have problems with blood flow to your legs or feet. When should you call for help? Call your doctor now or seek immediate medical care if:    · You have signs of infection, such as:  ? Increased pain, swelling, warmth, or redness around the corn or callus. ? Red streaks leading from the corn or callus. ? Pus draining from the corn or callus. ? A fever.    Watch closely for changes in your health, and be sure to contact your doctor if:    · You do not get better as expected. Where can you learn more? Go to http://cherise-nir.info/. Enter R244 in the search box to learn more about \"Corns and Calluses: Care Instructions. \"  Current as of: April 1, 2019  Content Version: 12.2  © 1733-7025 [employer]. Care instructions adapted under license by [employer] (which disclaims liability or warranty for this information). If you have questions about a medical condition or this instruction, always ask your healthcare professional. [address] any warranty or liability for your use of this information. We hope that we have addressed all of your medical concerns.  The examination and treatment you received in the Emergency Department were for an emergent problem and were not intended as complete care. It is important that you follow up with your healthcare provider(s) for ongoing care. If your symptoms worsen or do not improve as expected, and you are unable to reach your usual health care provider(s), you should return to the Emergency Department. Today's healthcare is undergoing tremendous change, and patient satisfaction surveys are one of the many tools to assess the quality of medical care. You may receive a survey from the [employer] organization regarding your experience in the Emergency Department. I hope that your experience has been completely positive, particularly the medical care that I provided. As such, please participate in the survey; anything less than excellent does not meet my expectations or intentions. [address] and [facility] participate in nationally recognized quality of care measures. If your blood pressure is greater than 120/80, as reported below, we urge that you seek medical care to address the potential of high blood pressure, commonly known as hypertension. Hypertension can be hereditary or can be caused by certain medical conditions, pain, stress, or \"white coat syndrome. \"       Please make an appointment with your health care provider(s) for follow up of your Emergency Department visit. VITALS:   Patient Vitals for the past 8 hrs:   Temp Pulse Resp BP SpO2   03/08/20 2011 98 °F (36.7 °C) 73 18 154/66 100 %          Thank you for allowing us to provide you with medical care today. We realize that you have many choices for your emergency care needs. Please choose us in the future for any continued health care needs. Les Clayton, [address]: [phone]            No results found for this or any previous visit (from the past 24 hour(s)).     Xr Foot Rt Min 3 V    Result Date: 3/8/2020  EXAM: XR FOOT RT MIN 3 V INDICATION: pain at 1st MTP joint. COMPARISON: None. FINDINGS: Three views of the right foot demonstrate no fracture or other acute osseous or articular abnormality. The soft tissues are within normal limits. Joint spaces are preserved. There is no bone erosion or periostitis. There is a plantar calcaneal spur. IMPRESSION: No acute finding.

## 2020-03-09 NOTE — ED NOTES
Foot xray completed at bedside. Pt medicated and resting comfortably. Visitor at bedside.  Call bell within reach and will continue to monitor

## 2020-09-22 ENCOUNTER — TELEPHONE (OUTPATIENT)
Dept: FAMILY MEDICINE CLINIC | Age: 40
End: 2020-09-22

## 2020-09-22 ENCOUNTER — VIRTUAL VISIT (OUTPATIENT)
Dept: FAMILY MEDICINE CLINIC | Age: 40
End: 2020-09-22
Payer: MEDICAID

## 2020-09-22 DIAGNOSIS — M79.7 FIBROMYALGIA: Primary | ICD-10-CM

## 2020-09-22 DIAGNOSIS — R53.83 FATIGUE, UNSPECIFIED TYPE: ICD-10-CM

## 2020-09-22 DIAGNOSIS — M25.50 ARTHRALGIA, UNSPECIFIED JOINT: ICD-10-CM

## 2020-09-22 PROCEDURE — 99214 OFFICE O/P EST MOD 30 MIN: CPT | Performed by: FAMILY MEDICINE

## 2020-09-22 RX ORDER — ACETAMINOPHEN 500 MG
500 TABLET ORAL
COMMUNITY
End: 2022-07-14

## 2020-09-22 RX ORDER — METHOCARBAMOL 500 MG/1
500 TABLET, FILM COATED ORAL
Qty: 40 TAB | Refills: 1 | Status: SHIPPED | OUTPATIENT
Start: 2020-09-22 | End: 2021-05-14

## 2020-09-22 RX ORDER — DULOXETIN HYDROCHLORIDE 30 MG/1
CAPSULE, DELAYED RELEASE ORAL
Qty: 60 CAP | Refills: 0 | Status: SHIPPED | OUTPATIENT
Start: 2020-09-22 | End: 2020-10-16

## 2020-09-22 NOTE — PROGRESS NOTES
Chief Complaint   Patient presents with    Generalized Body Aches    Fatigue   1. Have you been to the ER, urgent care clinic since your last visit? Hospitalized since your last visit? No    2. Have you seen or consulted any other health care providers outside of the [address] [place] [address] since your last visit? Include any pap smears or colon screening.  No

## 2020-09-22 NOTE — TELEPHONE ENCOUNTER
Please call patient and let her know she needs to go to [facility] for labs. Please find out which one she is going to and FAX the orders I printed.

## 2020-09-22 NOTE — PROGRESS NOTES
Lennox Rosas is a 38 y.o. female who was seen by synchronous (real-time) audio-video technology on 9/22/2020. Assessment & Plan:   Diagnoses and all orders for this visit:    1. Fibromyalgia  -     methocarbamoL (Robaxin) 500 mg tablet; Take 1 Tab by mouth four (4) times daily as needed (spasms). -     DULoxetine (Cymbalta) 30 mg capsule; One PO every day x 7 days, then 2 PO QD    2. Arthralgia, unspecified joint  -     METABOLIC PANEL, COMPREHENSIVE; Future  -     CBC WITH AUTOMATED DIFF; Future  -     SED RATE (ESR); Future  -     CRP, HIGH SENSITIVITY; Future    3. Fatigue, unspecified type  -     METABOLIC PANEL, COMPREHENSIVE; Future  -     CBC WITH AUTOMATED DIFF; Future  -     TSH 3RD GENERATION; Future        Likely all due to fibromyalgia  Start Cymbalta  Robaxin prn  Labs per orders. Follow-up and Dispositions    · Return in about 1 month (around 10/22/2020) for fibromyalgia. Reviewed plan of care. Patient has provided input and agrees with goals. CPT Codes 46924-40811 for Established Patients may apply to this Telehealth Visit      Subjective:   Lennox Rosas was seen for Generalized Body Aches and Fatigue      Patient presents with:  Generalized Body Aches  Fatigue    This has been going for 2-3 months but has become severe in the past 2 weeks. Her feet ache and burn such that is hard to get up in the am and she is getting muscle spasms throughout her body. She cannot sleep at night which makes her exhausted. In the past, she has been told she had fibromyalgia. No provocative factors. Nothing makes her better. Review of Systems   Constitutional: Positive for malaise/fatigue. Negative for chills, fever and weight loss. No weight gain   Eyes: Negative for redness. Musculoskeletal: Positive for back pain, joint pain, myalgias and neck pain. Negative for falls. Occasional knee swelling   Skin: Negative for rash.         No joint redness or warmth   Psychiatric/Behavioral: Negative for depression, hallucinations, substance abuse and suicidal ideas. The patient has insomnia. The patient is not nervous/anxious. Objective:     Physical Exam  Constitutional:       General: She is not in acute distress. Appearance: Normal appearance. She is not ill-appearing. Neurological:      Mental Status: She is alert and oriented to person, place, and time. Due to this being a TeleHealth evaluation, many elements of the physical examination are unable to be assessed. We discussed the expected course, resolution and complications of the diagnosis(es) in detail. Medication risks, benefits, costs, interactions, and alternatives were discussed as indicated. I advised her to contact the office if her condition worsens, changes or fails to improve as anticipated. She expressed understanding with the diagnosis(es) and plan. Pursuant to the emergency declaration under the [facility] [place]note 2[place], [facility] waiver authority and the [employer] General Act, this Virtual  Visit was conducted, with patient's consent, to reduce the patient's risk of exposure to COVID-19 and provide continuity of care for an established patient. Services were provided through a video synchronous discussion virtually to substitute for in-person clinic visit.     Eva Martin MD

## 2020-09-22 NOTE — TELEPHONE ENCOUNTER
Called pt, and left a voice message, asking that she call the office back in regards to where we need to send lab order too.

## 2020-09-30 LAB
ALBUMIN SERPL-MCNC: 4 G/DL (ref 3.8–4.8)
ALBUMIN/GLOB SERPL: 1.3 {RATIO} (ref 1.2–2.2)
ALP SERPL-CCNC: 86 IU/L (ref 39–117)
ALT SERPL-CCNC: 16 IU/L (ref 0–32)
AST SERPL-CCNC: 15 IU/L (ref 0–40)
BASOPHILS # BLD AUTO: 0 X10E3/UL (ref 0–0.2)
BASOPHILS NFR BLD AUTO: 0 %
BILIRUB SERPL-MCNC: <0.2 MG/DL (ref 0–1.2)
BUN SERPL-MCNC: 12 MG/DL (ref 6–24)
BUN/CREAT SERPL: 17 (ref 9–23)
CALCIUM SERPL-MCNC: 9 MG/DL (ref 8.7–10.2)
CHLORIDE SERPL-SCNC: 104 MMOL/L (ref 96–106)
CO2 SERPL-SCNC: 17 MMOL/L (ref 20–29)
CREAT SERPL-MCNC: 0.7 MG/DL (ref 0.57–1)
CRP SERPL HS-MCNC: 9.79 MG/L (ref 0–3)
EOSINOPHIL # BLD AUTO: 0.1 X10E3/UL (ref 0–0.4)
EOSINOPHIL NFR BLD AUTO: 1 %
ERYTHROCYTE [DISTWIDTH] IN BLOOD BY AUTOMATED COUNT: 12.9 % (ref 11.7–15.4)
ERYTHROCYTE [SEDIMENTATION RATE] IN BLOOD BY WESTERGREN METHOD: 53 MM/HR (ref 0–32)
GLOBULIN SER CALC-MCNC: 3 G/DL (ref 1.5–4.5)
GLUCOSE SERPL-MCNC: 98 MG/DL (ref 65–99)
HCT VFR BLD AUTO: 37 % (ref 34–46.6)
HGB BLD-MCNC: 12.3 G/DL (ref 11.1–15.9)
IMM GRANULOCYTES # BLD AUTO: 0 X10E3/UL (ref 0–0.1)
IMM GRANULOCYTES NFR BLD AUTO: 0 %
LYMPHOCYTES # BLD AUTO: 2.3 X10E3/UL (ref 0.7–3.1)
LYMPHOCYTES NFR BLD AUTO: 30 %
MCH RBC QN AUTO: 28.1 PG (ref 26.6–33)
MCHC RBC AUTO-ENTMCNC: 33.2 G/DL (ref 31.5–35.7)
MCV RBC AUTO: 85 FL (ref 79–97)
MONOCYTES # BLD AUTO: 0.4 X10E3/UL (ref 0.1–0.9)
MONOCYTES NFR BLD AUTO: 6 %
NEUTROPHILS # BLD AUTO: 4.8 X10E3/UL (ref 1.4–7)
NEUTROPHILS NFR BLD AUTO: 63 %
PLATELET # BLD AUTO: 317 X10E3/UL (ref 150–450)
POTASSIUM SERPL-SCNC: 4.4 MMOL/L (ref 3.5–5.2)
PROT SERPL-MCNC: 7 G/DL (ref 6–8.5)
RBC # BLD AUTO: 4.38 X10E6/UL (ref 3.77–5.28)
SODIUM SERPL-SCNC: 138 MMOL/L (ref 134–144)
TSH SERPL DL<=0.005 MIU/L-ACNC: 3.82 UIU/ML (ref 0.45–4.5)
WBC # BLD AUTO: 7.6 X10E3/UL (ref 3.4–10.8)

## 2020-10-04 DIAGNOSIS — M25.50 ARTHRALGIA, UNSPECIFIED JOINT: ICD-10-CM

## 2020-10-04 DIAGNOSIS — R70.0 ELEVATED SED RATE: Primary | ICD-10-CM

## 2020-10-04 DIAGNOSIS — R70.0 ELEVATED SED RATE: ICD-10-CM

## 2020-10-16 ENCOUNTER — VIRTUAL VISIT (OUTPATIENT)
Dept: FAMILY MEDICINE CLINIC | Age: 40
End: 2020-10-16
Payer: MEDICAID

## 2020-10-16 DIAGNOSIS — R79.82 ELEVATED C-REACTIVE PROTEIN (CRP): ICD-10-CM

## 2020-10-16 DIAGNOSIS — M79.7 FIBROMYALGIA: Primary | ICD-10-CM

## 2020-10-16 DIAGNOSIS — R70.0 ELEVATED SED RATE: ICD-10-CM

## 2020-10-16 DIAGNOSIS — F33.1 DEPRESSION, MAJOR, RECURRENT, MODERATE (HCC): ICD-10-CM

## 2020-10-16 PROCEDURE — 99213 OFFICE O/P EST LOW 20 MIN: CPT | Performed by: FAMILY MEDICINE

## 2020-10-16 RX ORDER — VENLAFAXINE HYDROCHLORIDE 37.5 MG/1
37.5 CAPSULE, EXTENDED RELEASE ORAL DAILY
Qty: 30 CAP | Refills: 0 | Status: SHIPPED | OUTPATIENT
Start: 2020-10-16 | End: 2020-11-19 | Stop reason: SDUPTHER

## 2020-10-16 NOTE — PROGRESS NOTES
Chief Complaint   Patient presents with    Medication Evaluation     discuss stopping Cymbalta - pt feelign fatigue and nausea and dizziness

## 2020-10-16 NOTE — PROGRESS NOTES
Ashley Chin is a 38 y.o. female who was seen by synchronous (real-time) audio-video technology on 10/16/2020. Assessment & Plan:   Diagnoses and all orders for this visit:    1. Fibromyalgia  -     venlafaxine-SR (EFFEXOR-XR) 37.5 mg capsule; Take 1 Cap by mouth daily. 2. Depression, major, recurrent, moderate (HCC)  -     venlafaxine-SR (EFFEXOR-XR) 37.5 mg capsule; Take 1 Cap by mouth daily. 3. Elevated sed rate    4. Elevated C-reactive protein (CRP)        Have been unable to do an exam on her yet, however, I highly suspect she has fibromyalgia  Depression making her pain worse  Did not tolerate Cymbalta  Start low dose Effexor  Await labs from yesterday    Follow-up and Dispositions    · Return in about 3 weeks (around 11/6/2020) for depression, fibromyalgia. Reviewed plan of care. Patient has provided input and agrees with goals. CPT Codes 20627-47700 for Established Patients may apply to this Telehealth Visit      Subjective:   Ashley Chin was seen for Medication Evaluation (discuss stopping Cymbalta - pt feelign fatigue and nausea and dizziness )      Patient presents with:  Medication Evaluation: discuss stopping Cymbalta - pt feelign fatigue and nausea and dizziness     She is here to follow up on her fibromyalgia. Her labs were significant for an elevated sed rate and CRP. Roldan Thomas She had additional lab work done yesterday. Obviously, she did not tolerate the Cymbalta. Nothing makes her better. \"I need something for my energy, I am tired all of the time. \"  Nothing in particular makes her worse. She is also having cramping in her hands and feet. Review of Systems   Constitutional: Positive for malaise/fatigue. Negative for chills, fever and weight loss. Weight gain   Respiratory: Negative for shortness of breath. Cardiovascular: Positive for palpitations. Negative for chest pain. Musculoskeletal: Positive for joint pain and myalgias.         No joint swelling   Skin:        No joint redness or warmth   Neurological: Positive for headaches. Endo/Heme/Allergies: Bruises/bleeds easily. Psychiatric/Behavioral: Positive for depression. Negative for hallucinations, substance abuse and suicidal ideas. The patient has insomnia. The patient is not nervous/anxious. Objective:     Physical Exam  Constitutional:       General: She is not in acute distress. Appearance: Normal appearance. Neurological:      Mental Status: She is alert and oriented to person, place, and time. Motor: No tremor. Psychiatric:         Mood and Affect: Mood normal.         Behavior: Behavior normal.         Thought Content: Thought content normal.         Judgment: Judgment normal.         Due to this being a TeleHealth evaluation, many elements of the physical examination are unable to be assessed. We discussed the expected course, resolution and complications of the diagnosis(es) in detail. Medication risks, benefits, costs, interactions, and alternatives were discussed as indicated. I advised her to contact the office if her condition worsens, changes or fails to improve as anticipated. She expressed understanding with the diagnosis(es) and plan. Pursuant to the emergency declaration under the [facility], [facility] waiver authority and the [employer] General Act, this Virtual  Visit was conducted, with patient's consent, to reduce the patient's risk of exposure to COVID-19 and provide continuity of care for an established patient. Services were provided through a video synchronous discussion virtually to substitute for in-person clinic visit.     Nilson Ordaz MD

## 2020-10-23 LAB
14.3.3 ETA, RHEUM. ARTHRITIS: <0.2 NG/ML
CCP IGA+IGG SERPL IA-ACNC: <20 UNITS
CENTROMERE B AB SER-ACNC: <0.2 AI (ref 0–0.9)
CHROMATIN AB SERPL-ACNC: <0.2 AI (ref 0–0.9)
DSDNA AB SER-ACNC: <1 IU/ML (ref 0–9)
ENA JO1 AB SER-ACNC: <0.2 AI (ref 0–0.9)
ENA RNP AB SER-ACNC: <0.2 AI (ref 0–0.9)
ENA SCL70 AB SER-ACNC: <0.2 AI (ref 0–0.9)
ENA SM AB SER-ACNC: <0.2 AI (ref 0–0.9)
ENA SS-A AB SER-ACNC: <0.2 AI (ref 0–0.9)
ENA SS-B AB SER-ACNC: <0.2 AI (ref 0–0.9)
RHEUMATOID FACT SERPL-ACNC: <14 UNITS/ML
SEE BELOW, 164869: NORMAL

## 2020-11-16 ENCOUNTER — TELEPHONE (OUTPATIENT)
Dept: FAMILY MEDICINE CLINIC | Age: 40
End: 2020-11-16

## 2020-11-16 NOTE — TELEPHONE ENCOUNTER
Called and spoke with pt, and she has been advised and states understanding of her lab results, per lab letter. Pt has scheduled a follow up for 11/19/20.

## 2020-11-16 NOTE — TELEPHONE ENCOUNTER
Pt called to get clarification on lab results received in letter with numerous results that she does not understand. Pt requests call back at  to advise.  Francisco

## 2020-11-19 ENCOUNTER — VIRTUAL VISIT (OUTPATIENT)
Dept: FAMILY MEDICINE CLINIC | Age: 40
End: 2020-11-19
Payer: MEDICAID

## 2020-11-19 DIAGNOSIS — M79.7 FIBROMYALGIA: ICD-10-CM

## 2020-11-19 DIAGNOSIS — F33.1 DEPRESSION, MAJOR, RECURRENT, MODERATE (HCC): Primary | ICD-10-CM

## 2020-11-19 DIAGNOSIS — F41.9 ANXIETY: ICD-10-CM

## 2020-11-19 PROCEDURE — 99214 OFFICE O/P EST MOD 30 MIN: CPT | Performed by: FAMILY MEDICINE

## 2020-11-19 RX ORDER — VENLAFAXINE HYDROCHLORIDE 75 MG/1
75 CAPSULE, EXTENDED RELEASE ORAL DAILY
Qty: 30 CAP | Refills: 0 | Status: SHIPPED | OUTPATIENT
Start: 2020-11-19 | End: 2021-01-18

## 2020-11-19 NOTE — PROGRESS NOTES
Chief Complaint   Patient presents with    Depression     follow up     Fibromyalgia     follow up - 08/10 on pain scale

## 2020-11-19 NOTE — PROGRESS NOTES
Krystin Beltre is a 38 y.o. female who was seen by synchronous (real-time) audio-video technology on 11/19/2020. Assessment & Plan:   Diagnoses and all orders for this visit:    1. Depression, major, recurrent, moderate (HCC)  -     venlafaxine-SR (EFFEXOR-XR) 75 mg capsule; Take 1 Cap by mouth daily. 2. Anxiety  -     venlafaxine-SR (EFFEXOR-XR) 75 mg capsule; Take 1 Cap by mouth daily. 3. Fibromyalgia  -     venlafaxine-SR (EFFEXOR-XR) 75 mg capsule; Take 1 Cap by mouth daily. Mild improvement but on a sub-therapeutic dose, fortunately, she is tolerating the Effexor  Increase Effexor    Follow-up and Dispositions    · Return in about 3 weeks (around 12/10/2020) for depression, anxiety, fibromyalgia. Reviewed plan of care. Patient has provided input and agrees with goals. CPT Codes 78379-30153 for Established Patients may apply to this Telehealth Visit      Subjective:   Krystin Beltre was seen for Depression (follow up ) and Fibromyalgia (follow up - 08/10 on pain scale )      Patient presents with:  Depression: follow up   Fibromyalgia: follow up - 08/10 on pain scale     Krystin Beltre is here to follow up on their depression and anxiety. He/she is a little better. Currently, they are taking Effexor which is/are working mildly to moderately. She is tolerating the Effexor. She also needs to follow up on her fibromyalgia. Her pain is a little better, but she still has the headaches. Review of Systems   Constitutional: Negative for malaise/fatigue and weight loss. No weight gain. Some days she is fatigued, but she is better. Respiratory: Negative for shortness of breath. Cardiovascular: Negative for chest pain and palpitations. Musculoskeletal: Positive for myalgias. Neurological: Positive for headaches. Psychiatric/Behavioral: Negative for depression, hallucinations, substance abuse and suicidal ideas. The patient has insomnia.  The patient is not nervous/anxious. Objective:   /51, P 61    Physical Exam  Constitutional:       General: She is not in acute distress. Appearance: Normal appearance. Neurological:      Mental Status: She is alert and oriented to person, place, and time. Motor: No tremor. Psychiatric:         Mood and Affect: Mood normal.         Behavior: Behavior normal.         Thought Content: Thought content normal.         Judgment: Judgment normal.         Due to this being a TeleHealth evaluation, many elements of the physical examination are unable to be assessed. We discussed the expected course, resolution and complications of the diagnosis(es) in detail. Medication risks, benefits, costs, interactions, and alternatives were discussed as indicated. I advised her to contact the office if her condition worsens, changes or fails to improve as anticipated. She expressed understanding with the diagnosis(es) and plan. Pursuant to the emergency declaration under the [facility] [place], [facility] waiver authority and the [employer] General Act, this Virtual  Visit was conducted, with patient's consent, to reduce the patient's risk of exposure to COVID-19 and provide continuity of care for an established patient. Services were provided through a video synchronous discussion virtually to substitute for in-person clinic visit.     Virgen Hewitt MD

## 2021-01-16 DIAGNOSIS — F41.9 ANXIETY: ICD-10-CM

## 2021-01-16 DIAGNOSIS — M79.7 FIBROMYALGIA: ICD-10-CM

## 2021-01-16 DIAGNOSIS — F33.1 DEPRESSION, MAJOR, RECURRENT, MODERATE (HCC): ICD-10-CM

## 2021-01-18 RX ORDER — VENLAFAXINE HYDROCHLORIDE 75 MG/1
CAPSULE, EXTENDED RELEASE ORAL
Qty: 90 CAP | Refills: 3 | Status: SHIPPED | OUTPATIENT
Start: 2021-01-18 | End: 2021-05-14

## 2021-05-14 ENCOUNTER — HOSPITAL ENCOUNTER (EMERGENCY)
Age: 41
Discharge: HOME | End: 2021-05-14
Attending: STUDENT IN AN ORGANIZED HEALTH CARE EDUCATION/TRAINING PROGRAM
Payer: MEDICAID

## 2021-05-14 ENCOUNTER — APPOINTMENT (OUTPATIENT)
Dept: GENERAL RADIOLOGY | Age: 41
End: 2021-05-14
Attending: STUDENT IN AN ORGANIZED HEALTH CARE EDUCATION/TRAINING PROGRAM
Payer: MEDICAID

## 2021-05-14 VITALS
RESPIRATION RATE: 16 BRPM | BODY MASS INDEX: 59.07 KG/M2 | WEIGHT: 293 LBS | DIASTOLIC BLOOD PRESSURE: 51 MMHG | HEART RATE: 98 BPM | HEIGHT: 59 IN | OXYGEN SATURATION: 98 % | TEMPERATURE: 98.6 F | SYSTOLIC BLOOD PRESSURE: 112 MMHG

## 2021-05-14 DIAGNOSIS — M79.672 PAIN OF LEFT HEEL: Primary | ICD-10-CM

## 2021-05-14 PROCEDURE — 73630 X-RAY EXAM OF FOOT: CPT

## 2021-05-14 PROCEDURE — 99283 EMERGENCY DEPT VISIT LOW MDM: CPT

## 2021-05-14 RX ORDER — KETOROLAC TROMETHAMINE 10 MG/1
10 TABLET, FILM COATED ORAL
Qty: 12 TAB | Refills: 0 | Status: SHIPPED | OUTPATIENT
Start: 2021-05-14 | End: 2021-05-18

## 2021-05-14 RX ORDER — PREDNISONE 50 MG/1
50 TABLET ORAL DAILY
Qty: 3 TAB | Refills: 0 | Status: SHIPPED | OUTPATIENT
Start: 2021-05-14 | End: 2021-05-17

## 2021-05-14 NOTE — ED NOTES
Pt given discharge instructions by Dr Meron Marte she verbalizes an understanding pt stable at time of discharge

## 2021-05-14 NOTE — ED TRIAGE NOTES
Pt assisted to treatment area via wheelchair she states that for the past month the bottom of her left foot has been hurting from the arch up and around the heel. She states that now the pain is coming up into her knee and also affecting her right hip because of the way she is walking now. She states that when she first steps onto the foot it hurts the worse.

## 2021-05-15 NOTE — ED PROVIDER NOTES
Patient is a 43-year-old female present emergency department for left foot pain. Patient states that the left heel has been hurting her for the last month she says now that the pain is constant and is radiating up her left leg to her knee. She feels that now is causing her to shift her weight to her right leg causing right hip pain now. She denies any traumatic injury or falls.            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    Irregular menses     Migraine without aura and without status migrainosus, not intractable 2017       Past Surgical History:   Procedure Laterality Date    HX GYN      HX LAP CHOLECYSTECTOMY      HX ORTHOPAEDIC      HX TONSILLECTOMY           Family History:   Problem Relation Age of Onset    Heart Attack Mother     Stroke Mother     Hypertension Sister     High Cholesterol Sister     Diabetes Sister     Migraines Sister     Cancer Paternal Grandfather     HIV/AIDS Neg Hx        Social History     Socioeconomic History    Marital status: SINGLE     Spouse name: Not on file    Number of children: Not on file    Years of education: Not on file    Highest education level: Not on file   Occupational History    Not on file   Social Needs    Financial resource strain: Not on file    Food insecurity     Worry: Not on file     Inability: Not on file    Transportation needs     Medical: Not on file     Non-medical: Not on file   Tobacco Use    Smoking status: Former Smoker     Years: 2.00     Quit date: 2007     Years since quittin.2    Smokeless tobacco: Never Used   Substance and Sexual Activity    Alcohol use: No    Drug use: No    Sexual activity: Yes     Partners: Male   Lifestyle    Physical activity     Days per week: Not on file     Minutes per session: Not on file    Stress: Not on file   Relationships    Social connections     Talks on phone: Not on file     Gets together: Not on file     Attends Yarsanism service: Not on file     Active member of club or organization: Not on file     Attends meetings of clubs or organizations: Not on file     Relationship status: Not on file    Intimate partner violence     Fear of current or ex partner: Not on file     Emotionally abused: Not on file     Physically abused: Not on file     Forced sexual activity: Not on file   Other Topics Concern    Not on file   Social History Narrative    Not on file         ALLERGIES: Patient has no known allergies. Review of Systems   Musculoskeletal: Positive for arthralgias and gait problem. All other systems reviewed and are negative. Vitals:    05/14/21 1511 05/14/21 1615 05/14/21 1630   BP: (!) 159/79 (!) 128/47 (!) 112/51   Pulse: 98     Resp: 16     Temp: 98.6 °F (37 °C)     SpO2: 97% 98% 98%   Weight: 136 kg (299 lb 13.2 oz)     Height: 4' 11\" (1.499 m)              Physical Exam  Vitals signs and nursing note reviewed. Constitutional:       Appearance: Normal appearance. She is obese. HENT:      Head: Normocephalic and atraumatic. Eyes:      Extraocular Movements: Extraocular movements intact. Conjunctiva/sclera: Conjunctivae normal.      Pupils: Pupils are equal, round, and reactive to light. Neck:      Musculoskeletal: Normal range of motion and neck supple. Cardiovascular:      Rate and Rhythm: Normal rate and regular rhythm. Pulmonary:      Effort: Pulmonary effort is normal.      Breath sounds: Normal breath sounds. Musculoskeletal: Normal range of motion. Feet:    Feet:      Comments: Tender to touch  Skin:     General: Skin is warm. Neurological:      General: No focal deficit present. Mental Status: She is alert and oriented to person, place, and time.    Psychiatric:         Mood and Affect: Mood normal.         Behavior: Behavior normal.          MDM  Number of Diagnoses or Management Options  Pain of left heel  Diagnosis management comments: A/P: Calcaneus contusion, fallen arch, plantar fasciitis. 48-year-old female presenting with left heel pain no traumatic injury. Patient could have compromise of her arch will obtain x-ray left foot will treat with steroids, NSAIDs have patient follow-up with podiatry.        Amount and/or Complexity of Data Reviewed  Tests in the radiology section of CPT®: reviewed and ordered           Procedures

## 2021-05-18 ENCOUNTER — OFFICE VISIT (OUTPATIENT)
Dept: FAMILY MEDICINE CLINIC | Age: 41
End: 2021-05-18
Payer: MEDICAID

## 2021-05-18 VITALS
TEMPERATURE: 97.8 F | OXYGEN SATURATION: 97 % | HEART RATE: 67 BPM | HEIGHT: 59 IN | BODY MASS INDEX: 59.07 KG/M2 | RESPIRATION RATE: 18 BRPM | SYSTOLIC BLOOD PRESSURE: 144 MMHG | WEIGHT: 293 LBS | DIASTOLIC BLOOD PRESSURE: 87 MMHG

## 2021-05-18 DIAGNOSIS — G43.009 MIGRAINE WITHOUT AURA AND WITHOUT STATUS MIGRAINOSUS, NOT INTRACTABLE: ICD-10-CM

## 2021-05-18 DIAGNOSIS — M72.2 PLANTAR FASCIITIS: ICD-10-CM

## 2021-05-18 DIAGNOSIS — F33.1 DEPRESSION, MAJOR, RECURRENT, MODERATE (HCC): ICD-10-CM

## 2021-05-18 DIAGNOSIS — F41.9 ANXIETY: ICD-10-CM

## 2021-05-18 DIAGNOSIS — M79.7 FIBROMYALGIA: Primary | ICD-10-CM

## 2021-05-18 PROCEDURE — 99214 OFFICE O/P EST MOD 30 MIN: CPT | Performed by: FAMILY MEDICINE

## 2021-05-18 RX ORDER — DICLOFENAC SODIUM 75 MG/1
75 TABLET, DELAYED RELEASE ORAL
Qty: 60 TAB | Refills: 0 | Status: SHIPPED | OUTPATIENT
Start: 2021-05-18 | End: 2022-07-14

## 2021-05-18 RX ORDER — NORTRIPTYLINE HYDROCHLORIDE 10 MG/1
10 CAPSULE ORAL
Qty: 30 CAP | Refills: 1 | Status: SHIPPED | OUTPATIENT
Start: 2021-05-18 | End: 2021-06-28 | Stop reason: ALTCHOICE

## 2021-05-18 NOTE — PROGRESS NOTES
Family Medicine Acute Visit Progress Note  Patient: Rosanne Vogt  1980, 38 y.o., female  Encounter Date: 5/18/2021    ASSESSMENT & PLAN    ICD-10-CM ICD-9-CM    1. Fibromyalgia  M79.7 729.1 nortriptyline (PAMELOR) 10 mg capsule   2. Depression, major, recurrent, moderate (HCC)  F33.1 296.32 nortriptyline (PAMELOR) 10 mg capsule   3. Anxiety  F41.9 300.00 nortriptyline (PAMELOR) 10 mg capsule   4. Migraine without aura and without status migrainosus, not intractable  G43.009 346.10 nortriptyline (PAMELOR) 10 mg capsule   5. Plantar fasciitis  M72.2 728.71 diclofenac EC (VOLTAREN) 75 mg EC tablet      REFERRAL TO PODIATRY       Orders Placed This Encounter    REFERRAL TO PODIATRY     Referral Priority:   Routine     Referral Type:   Consultation     Referral Reason:   Specialty Services Required     Referred to Provider:   Kimber Stephenson DPM     Requested Specialty:   Podiatry     Number of Visits Requested:   1    diclofenac EC (VOLTAREN) 75 mg EC tablet     Sig: Take 1 Tab by mouth two (2) times daily as needed for Pain. TAKE WITH FOOD     Dispense:  60 Tab     Refill:  0    nortriptyline (PAMELOR) 10 mg capsule     Sig: Take 1 Cap by mouth nightly.      Dispense:  30 Cap     Refill:  1       Trial NSAID  Protect stomach--take with food  Podiatry referral    For fibro/neck muscle spasms/headaches can trial nortriptyline and check with Dr Haylee Gonzalez in 2 wk for follow up  Discussed multiple meds failed int he past    Reviewed er chart  CHIEF COMPLAINT  Chief Complaint   Patient presents with    Follow-up     pt has fibromyalgia and is having head pain and sharp cramps in her abd flank        Miky Ansari is a 38 y.o. female presenting today for pain  For the last 3 days the patinet is having spasms int he back of her head, it is causing her to be dizzy and have headaches  Usually having pain in hips but now she is having spasms in abdominal wall and she feels like there is a knot or ball in her stomach and it makes it hard for her to breathe at times    Friday she felt she couldn't walk on her foot-she went ot ER, was dx with Plantar Fasciitis, she thinks she had surgery for it bue she can't get up out of bed to walk, she's having trouble moving around, her heel pain is tremendous and she is in tears atnight  She got prednisone that eased the pain ands he's still having pain, throbbing and soreness  She can't sleep at night     She feels nauseated allt he time and not having an appetite    Wt Readings from Last 3 Encounters:   05/18/21 296 lb (134.3 kg)   05/14/21 299 lb 13.2 oz (136 kg)   03/08/20 252 lb (114.3 kg)     Has failed cymbalta, robaxin in the past, also failed effexor int he past  Gabapentin changed mood    Review of Systems  A 12 point review of systems was negative except as noted here or in the HPI. OBJECTIVE  Visit Vitals  BP (!) 144/87   Pulse 67   Temp 97.8 °F (36.6 °C) (Tympanic)   Resp 18   Ht 4' 11\" (1.499 m)   Wt 296 lb (134.3 kg)   SpO2 97%   BMI 59.78 kg/m²       Physical Exam  Constitutional:       General: She is not in acute distress. Appearance: Normal appearance. She is obese. She is not ill-appearing, toxic-appearing or diaphoretic. HENT:      Head: Normocephalic and atraumatic. Right Ear: External ear normal.      Left Ear: External ear normal.      Mouth/Throat:      Mouth: Mucous membranes are moist.   Eyes:      General: No scleral icterus. Right eye: No discharge. Left eye: No discharge. Comments: eom grossly intact   Neck:      Comments: No visible neck masses , ROM appears normal from visual inspection  Pulmonary:      Effort: Pulmonary effort is normal. No respiratory distress.    Musculoskeletal:      Comments: meck muscle spasms palpable , painful ROM neck, pain with dorsi/plantar flexion L ankle   Skin:     Comments: Visible skin is without jaundice, bruising, lesion, pallor, erythema or rash except as otherwise noted Neurological:      General: No focal deficit present. Mental Status: She is alert and oriented to person, place, and time. Psychiatric:         Mood and Affect: Mood normal.         Behavior: Behavior normal.         Thought Content: Thought content normal.         Judgment: Judgment normal.         No results found for any visits on 05/18/21. HISTORICAL  PMH, PSH, FHX, SOCHX, ALLERGIES and MEDS were reviewed and updated today. Vishnu Fajardo MD  [facility]  05/18/21 3:45 PM    Portions of this note may have been populated using smart dictation software and may have \"sounds-like\" errors present. Pt was counseled on risks, benefits and alternatives of treatment options. All questions were asked and answered and the patient was agreeable with the treatment plan as outlined.

## 2021-05-18 NOTE — PROGRESS NOTES
Chief Complaint   Patient presents with    Follow-up     pt has fibromyalgia and is having head pain and sharp cramps in her abd flank

## 2021-06-28 ENCOUNTER — OFFICE VISIT (OUTPATIENT)
Dept: FAMILY MEDICINE CLINIC | Age: 41
End: 2021-06-28
Payer: MEDICAID

## 2021-06-28 VITALS
HEIGHT: 59 IN | RESPIRATION RATE: 20 BRPM | WEIGHT: 293 LBS | TEMPERATURE: 97.7 F | BODY MASS INDEX: 59.07 KG/M2 | OXYGEN SATURATION: 97 % | DIASTOLIC BLOOD PRESSURE: 77 MMHG | SYSTOLIC BLOOD PRESSURE: 143 MMHG | HEART RATE: 87 BPM

## 2021-06-28 DIAGNOSIS — F41.9 ANXIETY: ICD-10-CM

## 2021-06-28 DIAGNOSIS — M79.7 FIBROMYALGIA: Primary | ICD-10-CM

## 2021-06-28 DIAGNOSIS — F33.1 DEPRESSION, MAJOR, RECURRENT, MODERATE (HCC): ICD-10-CM

## 2021-06-28 PROCEDURE — 99214 OFFICE O/P EST MOD 30 MIN: CPT | Performed by: FAMILY MEDICINE

## 2021-06-28 RX ORDER — PREGABALIN 75 MG/1
75 CAPSULE ORAL 2 TIMES DAILY
Qty: 60 CAPSULE | Refills: 0 | Status: SHIPPED | OUTPATIENT
Start: 2021-06-28 | End: 2021-07-20

## 2021-06-28 RX ORDER — VENLAFAXINE HYDROCHLORIDE 75 MG/1
75 CAPSULE, EXTENDED RELEASE ORAL DAILY
Qty: 30 CAPSULE | Refills: 0 | Status: SHIPPED | OUTPATIENT
Start: 2021-06-28 | End: 2021-07-20

## 2021-06-28 NOTE — PROGRESS NOTES
HISTORY OF PRESENT ILLNESS  Debra Jj is a 38 y.o. female. Patient presents with:  Fibromyalgia: follow up -nortriptyline (PAMELOR) 10 mg capsule - RX is not working well, feels worse     Her neck and right shoulder hurt all the time. Also, she is having pain in her spine, hips and hands. She cannot sleep at night due to the pain. She needs follow up on her depression and anxiety. Also, she is crying a lot. Also, she was on Effexor, but stopped it because it was not working, however, she never came in for follow up. Review of Systems   Constitutional: Positive for malaise/fatigue. Negative for weight loss. Weight gain   Respiratory: Negative for shortness of breath. Cardiovascular: Positive for palpitations. Negative for chest pain. Psychiatric/Behavioral: Positive for depression. Negative for hallucinations, substance abuse and suicidal ideas. The patient has insomnia. The patient is not nervous/anxious. Visit Vitals  BP (!) 143/77   Pulse 87   Temp 97.7 °F (36.5 °C) (Tympanic)   Resp 20   Ht 4' 11\" (1.499 m)   Wt 302 lb (137 kg)   SpO2 97%   BMI 61.00 kg/m²     BP Readings from Last 3 Encounters:   06/28/21 (!) 143/77   05/18/21 (!) 144/87   05/14/21 (!) 112/51       Physical Exam  Constitutional:       General: She is not in acute distress. Appearance: Normal appearance. Neurological:      Mental Status: She is alert and oriented to person, place, and time. Motor: No tremor. Psychiatric:         Attention and Perception: Attention normal.         Mood and Affect: Mood is depressed. Affect is tearful. Speech: Speech normal.         Behavior: Behavior normal.         Thought Content: Thought content normal.         Judgment: Judgment normal.         ASSESSMENT and PLAN    ICD-10-CM ICD-9-CM    1. Fibromyalgia  M79.7 729.1 pregabalin (LYRICA) 75 mg capsule   2.  Depression, major, recurrent, moderate (HCC)  F33.1 296.32 venlafaxine-SR (Effexor XR) 75 mg capsule   3. Anxiety  F41.9 300.00 venlafaxine-SR (Effexor XR) 75 mg capsule        Fibromyalgia worse  Depression worse, anxiety doing OK  Start Lyrica  Restart Effexor    Follow-up and Dispositions    · Return in about 3 weeks (around 7/19/2021) for depression, anxiety, fibromyalgia. Reviewed plan of care. Patient has provided input and agrees with goals.

## 2021-06-28 NOTE — PROGRESS NOTES
Chief Complaint   Patient presents with    Fibromyalgia     follow up -nortriptyline (PAMELOR) 10 mg capsule - RX is not working well, feels worse

## 2021-07-20 ENCOUNTER — VIRTUAL VISIT (OUTPATIENT)
Dept: FAMILY MEDICINE CLINIC | Age: 41
End: 2021-07-20
Payer: MEDICAID

## 2021-07-20 DIAGNOSIS — G47.00 INSOMNIA, UNSPECIFIED TYPE: ICD-10-CM

## 2021-07-20 DIAGNOSIS — F41.9 ANXIETY: ICD-10-CM

## 2021-07-20 DIAGNOSIS — M79.7 FIBROMYALGIA: ICD-10-CM

## 2021-07-20 DIAGNOSIS — F33.1 DEPRESSION, MAJOR, RECURRENT, MODERATE (HCC): Primary | ICD-10-CM

## 2021-07-20 PROCEDURE — 99214 OFFICE O/P EST MOD 30 MIN: CPT | Performed by: FAMILY MEDICINE

## 2021-07-20 RX ORDER — TRAZODONE HYDROCHLORIDE 50 MG/1
50 TABLET ORAL
Qty: 30 TABLET | Refills: 0 | Status: SHIPPED | OUTPATIENT
Start: 2021-07-20 | End: 2022-07-14

## 2021-07-20 RX ORDER — VENLAFAXINE HYDROCHLORIDE 75 MG/1
75 CAPSULE, EXTENDED RELEASE ORAL
Qty: 30 CAPSULE | Refills: 0 | Status: SHIPPED | OUTPATIENT
Start: 2021-07-20 | End: 2022-07-14

## 2021-07-20 NOTE — PROGRESS NOTES
Chief Complaint   Patient presents with    Medication Evaluation     Fibromyalgia     1. Have you been to the ER, urgent care clinic since your last visit? Hospitalized since your last visit? No    2. Have you seen or consulted any other health care providers outside of the [address] [place] [address] since your last visit? Include any pap smears or colon screening.  No

## 2021-07-20 NOTE — PROGRESS NOTES
Jaskaran Tobar is a 38 y.o. female who was seen by synchronous (real-time) audio-video technology on 7/20/2021. Assessment & Plan:   Diagnoses and all orders for this visit:    1. Depression, major, recurrent, moderate (HCC)  -     venlafaxine-SR (Effexor XR) 75 mg capsule; Take 1 Capsule by mouth nightly. 2. Anxiety  -     venlafaxine-SR (Effexor XR) 75 mg capsule; Take 1 Capsule by mouth nightly. 3. Insomnia, unspecified type  -     traZODone (DESYREL) 50 mg tablet; Take 1 Tablet by mouth nightly. 4. Fibromyalgia        No better on all accounts, side effects from either Lyrica, Effexor or both  Insomnia making fibromyalgia worse  Stop Lyrica for now  Take Effexor HS  Trazodone       Follow-up and Dispositions    · Return in about 3 weeks (around 8/10/2021) for depression, anxiety, insomnia, fibromyalgia. Reviewed plan of care. Patient has provided input and agrees with goals. CPT Codes 83243-04189 for Established Patients may apply to this Telehealth Visit      Subjective:   Jaskaran Tobar was seen for Medication Evaluation (Fibromyalgia)      Jaskaarn Tobar is here to follow up on their anxiety and depression. He/she is unchanged. Also, she needs to follow up on her fibromyalgia. This has also not changed. Since starting Lyrica and Effexor, she sleeps all the time and stays up all night. Also, she is nauseated. She is more sleepy in the middle of the day. Review of Systems   Constitutional: Positive for malaise/fatigue. Negative for weight loss. No weight gain   Respiratory: Negative for shortness of breath. Cardiovascular: Positive for palpitations. Negative for chest pain. Psychiatric/Behavioral: Negative for depression, hallucinations, substance abuse and suicidal ideas. The patient has insomnia. The patient is not nervous/anxious. Objective:     Physical Exam  Constitutional:       General: She is not in acute distress. Appearance: Normal appearance. Comments: Appears sleepy   Neurological:      Mental Status: She is alert and oriented to person, place, and time. Motor: No tremor. Psychiatric:         Mood and Affect: Mood normal.         Behavior: Behavior normal.         Thought Content: Thought content normal.         Judgment: Judgment normal.         Due to this being a TeleHealth evaluation, many elements of the physical examination are unable to be assessed. We discussed the expected course, resolution and complications of the diagnosis(es) in detail. Medication risks, benefits, costs, interactions, and alternatives were discussed as indicated. I advised her to contact the office if her condition worsens, changes or fails to improve as anticipated. She expressed understanding with the diagnosis(es) and plan. Pursuant to the emergency declaration under the [facility] waiver authority and the [employer] General Act, this Virtual  Visit was conducted, with patient's consent, to reduce the patient's risk of exposure to COVID-19 and provide continuity of care for an established patient. Services were provided through a video synchronous discussion virtually to substitute for in-person clinic visit.     Jessie Morales MD

## 2022-02-22 ENCOUNTER — TELEPHONE (OUTPATIENT)
Dept: FAMILY MEDICINE CLINIC | Age: 42
End: 2022-02-22

## 2022-02-22 NOTE — TELEPHONE ENCOUNTER
----- Message from Renee Milton sent at 2/21/2022  4:17 PM EST -----  Subject: Message to Provider    QUESTIONS  Information for Provider? Pt is calling in requesting her blood type;   please call pt  ---------------------------------------------------------------------------  --------------  CALL BACK INFO  What is the best way for the office to contact you? OK to leave message on   voicemail  Preferred Call Back Phone Number?  [phone]  ---------------------------------------------------------------------------  --------------  SCRIPT ANSWERS  undefined

## 2022-02-22 NOTE — TELEPHONE ENCOUNTER
Called and spoke with pt, and she has been advised we do order blood type, as it is not covered by insurance. Advised GYN, if had child, or if she donated blood would be a better ave. Pt agrees with plan.

## 2022-03-20 PROBLEM — G43.009 MIGRAINE WITHOUT AURA AND WITHOUT STATUS MIGRAINOSUS, NOT INTRACTABLE: Status: ACTIVE | Noted: 2017-08-01

## 2022-07-14 ENCOUNTER — HOSPITAL ENCOUNTER (EMERGENCY)
Age: 42
Discharge: HOME | End: 2022-07-14
Attending: EMERGENCY MEDICINE
Payer: MEDICAID

## 2022-07-14 ENCOUNTER — APPOINTMENT (OUTPATIENT)
Dept: GENERAL RADIOLOGY | Age: 42
End: 2022-07-14
Attending: EMERGENCY MEDICINE
Payer: MEDICAID

## 2022-07-14 VITALS
DIASTOLIC BLOOD PRESSURE: 66 MMHG | OXYGEN SATURATION: 98 % | SYSTOLIC BLOOD PRESSURE: 124 MMHG | HEIGHT: 60 IN | TEMPERATURE: 98.7 F | RESPIRATION RATE: 16 BRPM | HEART RATE: 84 BPM | BODY MASS INDEX: 54.97 KG/M2 | WEIGHT: 280 LBS

## 2022-07-14 DIAGNOSIS — R07.9 CHEST PAIN, UNSPECIFIED TYPE: Primary | ICD-10-CM

## 2022-07-14 LAB
ALBUMIN SERPL-MCNC: 3.6 G/DL (ref 3.5–5)
ALBUMIN/GLOB SERPL: 0.9 {RATIO} (ref 1.1–2.2)
ALP SERPL-CCNC: 82 U/L (ref 45–117)
ALT SERPL-CCNC: 25 U/L (ref 12–78)
ANION GAP SERPL CALC-SCNC: 9 MMOL/L (ref 5–15)
AST SERPL-CCNC: 16 U/L (ref 15–37)
BASOPHILS # BLD: 0 K/UL (ref 0–0.1)
BASOPHILS NFR BLD: 0 % (ref 0–1)
BILIRUB SERPL-MCNC: 0.2 MG/DL (ref 0.2–1)
BUN SERPL-MCNC: 14 MG/DL (ref 6–20)
BUN/CREAT SERPL: 14 (ref 12–20)
CALCIUM SERPL-MCNC: 8.5 MG/DL (ref 8.5–10.1)
CHLORIDE SERPL-SCNC: 103 MMOL/L (ref 97–108)
CO2 SERPL-SCNC: 28 MMOL/L (ref 21–32)
CREAT SERPL-MCNC: 0.99 MG/DL (ref 0.55–1.02)
DIFFERENTIAL METHOD BLD: ABNORMAL
EOSINOPHIL # BLD: 0.1 K/UL (ref 0–0.4)
EOSINOPHIL NFR BLD: 1 % (ref 0–7)
ERYTHROCYTE [DISTWIDTH] IN BLOOD BY AUTOMATED COUNT: 13.3 % (ref 11.5–14.5)
GLOBULIN SER CALC-MCNC: 3.8 G/DL (ref 2–4)
GLUCOSE SERPL-MCNC: 89 MG/DL (ref 65–100)
HCG UR QL: NEGATIVE
HCG UR QL: NEGATIVE
HCT VFR BLD AUTO: 34.9 % (ref 35–47)
HGB BLD-MCNC: 11.2 G/DL (ref 11.5–16)
IMM GRANULOCYTES # BLD AUTO: 0 K/UL (ref 0–0.04)
IMM GRANULOCYTES NFR BLD AUTO: 0 % (ref 0–0.5)
LYMPHOCYTES # BLD: 2.4 K/UL (ref 0.8–3.5)
LYMPHOCYTES NFR BLD: 30 % (ref 12–49)
MCH RBC QN AUTO: 27.2 PG (ref 26–34)
MCHC RBC AUTO-ENTMCNC: 32.1 G/DL (ref 30–36.5)
MCV RBC AUTO: 84.7 FL (ref 80–99)
MONOCYTES # BLD: 0.5 K/UL (ref 0–1)
MONOCYTES NFR BLD: 6 % (ref 5–13)
NEUTS SEG # BLD: 5.1 K/UL (ref 1.8–8)
NEUTS SEG NFR BLD: 63 % (ref 32–75)
NRBC # BLD: 0 K/UL (ref 0–0.01)
NRBC BLD-RTO: 0 PER 100 WBC
PLATELET # BLD AUTO: 309 K/UL (ref 150–400)
PMV BLD AUTO: 10.1 FL (ref 8.9–12.9)
POTASSIUM SERPL-SCNC: 3.8 MMOL/L (ref 3.5–5.1)
PROT SERPL-MCNC: 7.4 G/DL (ref 6.4–8.2)
RBC # BLD AUTO: 4.12 M/UL (ref 3.8–5.2)
SODIUM SERPL-SCNC: 140 MMOL/L (ref 136–145)
TROPONIN-HIGH SENSITIVITY: 47 NG/L (ref 0–51)
WBC # BLD AUTO: 8.1 K/UL (ref 3.6–11)

## 2022-07-14 PROCEDURE — 36415 COLL VENOUS BLD VENIPUNCTURE: CPT

## 2022-07-14 PROCEDURE — 99285 EMERGENCY DEPT VISIT HI MDM: CPT

## 2022-07-14 PROCEDURE — 80053 COMPREHEN METABOLIC PANEL: CPT

## 2022-07-14 PROCEDURE — 84484 ASSAY OF TROPONIN QUANT: CPT

## 2022-07-14 PROCEDURE — 81025 URINE PREGNANCY TEST: CPT

## 2022-07-14 PROCEDURE — 93005 ELECTROCARDIOGRAM TRACING: CPT

## 2022-07-14 PROCEDURE — 85025 COMPLETE CBC W/AUTO DIFF WBC: CPT

## 2022-07-14 PROCEDURE — 71045 X-RAY EXAM CHEST 1 VIEW: CPT

## 2022-07-14 NOTE — ED PROVIDER NOTES
41F w/ hx depression and fibromyalgia p/w 3d of chest pain. Pt reports 3d of upper chest wall pain radiating to her shoulders and neck. Pain worse when she presses on her chest wall. Associated w/ nausea but no vomiting, diaphoresis. No F/C, cough, dyspnea. Lightheaded but no syncope. No drugs/etoh or hx of smoking. No recent surgeries, hospitalizations, travel, hx of malignancy, exogenous estrogen use, hemoptysis, LE pain/swelling, or hx of PE/DVT. Denies any prior cardiac hx.            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    Irregular menses     Migraine without aura and without status migrainosus, not intractable 8/1/2017    Plantar fasciitis        Past Surgical History:   Procedure Laterality Date    HX GYN      HX LAP CHOLECYSTECTOMY      HX ORTHOPAEDIC      HX TONSILLECTOMY           Family History:   Problem Relation Age of Onset    Heart Attack Mother     Stroke Mother     Hypertension Sister     High Cholesterol Sister     Diabetes Sister     Migraines Sister     Cancer Paternal Grandfather     HIV/AIDS Neg Hx        Social History     Socioeconomic History    Marital status: SINGLE     Spouse name: Not on file    Number of children: Not on file    Years of education: Not on file    Highest education level: Not on file   Occupational History    Not on file   Tobacco Use    Smoking status: Former Smoker     Years: 2.00     Quit date: 2/16/2007     Years since quitting: 15.4    Smokeless tobacco: Never Used   Vaping Use    Vaping Use: Never used   Substance and Sexual Activity    Alcohol use: No    Drug use: No    Sexual activity: Yes     Partners: Male   Other Topics Concern    Not on file   Social History Narrative    Not on file     Social Determinants of Health     Financial Resource Strain:     Difficulty of Paying Living Expenses: Not on file   Food Insecurity:     Worried About Running Out of Food in the Last Year: Not on file    Aliyah of Food in the Last Year: Not on file   Transportation Needs:     Lack of Transportation (Medical): Not on file    Lack of Transportation (Non-Medical): Not on file   Physical Activity:     Days of Exercise per Week: Not on file    Minutes of Exercise per Session: Not on file   Stress:     Feeling of Stress : Not on file   Social Connections:     Frequency of Communication with Friends and Family: Not on file    Frequency of Social Gatherings with Friends and Family: Not on file    Attends Anabaptist Services: Not on file    Active Member of [address] [place] [address] [employer] or Organizations: Not on file    Attends Club or Organization Meetings: Not on file    Marital Status: Not on file   Intimate Partner Violence:     Fear of Current or Ex-Partner: Not on file    Emotionally Abused: Not on file    Physically Abused: Not on file    Sexually Abused: Not on file   Housing Stability:     Unable to Pay for Housing in the Last Year: Not on file    Number of [place] in the Last Year: Not on file    Unstable Housing in the Last Year: Not on file         ALLERGIES: Gabapentin    Review of Systems   Constitutional: Negative for chills, diaphoresis and fever. HENT: Negative for facial swelling, mouth sores, nosebleeds, trouble swallowing and voice change. Eyes: Negative for pain and visual disturbance. Respiratory: Negative for apnea, cough, choking, shortness of breath, wheezing and stridor. Cardiovascular: Positive for chest pain. Negative for palpitations and leg swelling. Gastrointestinal: Positive for nausea. Negative for abdominal distention, abdominal pain, blood in stool, diarrhea and vomiting. Genitourinary: Negative for difficulty urinating, dysuria, flank pain, hematuria and pelvic pain. Musculoskeletal: Negative for joint swelling. Skin: Negative for color change and rash.    Allergic/Immunologic: Negative for immunocompromised state. Neurological: Positive for light-headedness. Negative for dizziness, seizures, syncope and speech difficulty. Hematological: Does not bruise/bleed easily. Psychiatric/Behavioral: Negative for agitation and behavioral problems. Vitals:    07/14/22 1804 07/14/22 1809   BP: 124/66    Pulse: 84    Resp: 16    Temp: 98.7 °F (37.1 °C)    SpO2: 98% 98%   Weight: 127 kg (280 lb)    Height: 5' (1.524 m)             Physical Exam  Vitals and nursing note reviewed. Constitutional:       General: She is not in acute distress. Appearance: Normal appearance. She is not ill-appearing or toxic-appearing. HENT:      Head: Normocephalic and atraumatic. Right Ear: External ear normal.      Left Ear: External ear normal.      Nose: Nose normal.      Mouth/Throat:      Mouth: Mucous membranes are moist.      Pharynx: Oropharynx is clear. No oropharyngeal exudate or posterior oropharyngeal erythema. Eyes:      General: No scleral icterus. Extraocular Movements: Extraocular movements intact. Conjunctiva/sclera: Conjunctivae normal.      Pupils: Pupils are equal, round, and reactive to light. Cardiovascular:      Rate and Rhythm: Normal rate and regular rhythm. Pulses: Normal pulses. Heart sounds: Normal heart sounds. No murmur heard. No friction rub. No gallop. Pulmonary:      Effort: Pulmonary effort is normal. No respiratory distress. Breath sounds: Normal breath sounds. No stridor. No wheezing, rhonchi or rales. Chest:      Chest wall: Tenderness present. Comments: Reproducible upper chest wall tenderness  Abdominal:      General: There is no distension. Palpations: Abdomen is soft. Tenderness: There is no abdominal tenderness. There is no guarding or rebound. Musculoskeletal:         General: No tenderness or deformity. Normal range of motion. Cervical back: Normal range of motion and neck supple. No rigidity.       Right lower leg: No edema. Left lower leg: No edema. Skin:     General: Skin is warm. Capillary Refill: Capillary refill takes less than 2 seconds. Coloration: Skin is not jaundiced. Neurological:      General: No focal deficit present. Mental Status: She is alert. Cranial Nerves: No cranial nerve deficit. Sensory: No sensory deficit. Motor: No weakness. Coordination: Coordination normal.   Psychiatric:         Mood and Affect: Mood normal.         Behavior: Behavior normal.         Thought Content: Thought content normal.         Judgment: Judgment normal.          MDM  Number of Diagnoses or Management Options  Chest pain, unspecified type  Diagnosis management comments: 41F w/ hx depression and fibromyalgia p/w 3d of chest pain. Pt nontoxic, afebrile, hemodynamically stable w/o resp distress or hypoxia. Ddx includes ACS vs cardiac dysrythmia vs pericarditis vs PNX vs PNA vs bronchitis/COPD/asthma vs CHF vs severe anemia vs HTN emergency/urgency vs gastritis/PUD/GERd vs pleurisy vs costochondritis, less likely PE based on Wells/geneva score and no tachycardia/hypoxia, much less likely esophageal rupture or AD/TAA based on presentation. Ordered CXR, EKG, labs. NSAIDs for pain. Monitor and reassess. 2000 Pt remains hemodyanmically stable. CXR unremarkable. EKG SR w/o ischemic changes or signs of pericarditis. Trop neg. HEART score=1 (risk factors), low risk for MACE, doubt ACS. Also low risk for PE by wells and PERC neg. Labs otherwise unremarkable. preg neg. Patient given specific return precautions and explained signs/symptoms for which to come back to ED immediately but otherwise advised to f/u w/ PCP over next 2-3days.          Amount and/or Complexity of Data Reviewed  Clinical lab tests: ordered and reviewed  Tests in the radiology section of CPT®: ordered and reviewed  Tests in the medicine section of CPT®: reviewed and ordered  Independent visualization of images, tracings, or specimens: yes    Risk of Complications, Morbidity, and/or Mortality  Presenting problems: moderate  Diagnostic procedures: moderate  Management options: moderate           Procedures      EKG Interpretation   SR, narrow QRS, nl intervals, no OMAIRA/STD/TWI  (EKG tracing interpreted by ED physician)    LABORATORY TESTS:  Admission on 07/14/2022, Discharged on 07/14/2022   Component Date Value Ref Range Status    WBC 07/14/2022 8.1  3.6 - 11.0 K/uL Final    RBC 07/14/2022 4.12  3.80 - 5.20 M/uL Final    HGB 07/14/2022 11.2* 11.5 - 16.0 g/dL Final    HCT 07/14/2022 34.9* 35.0 - 47.0 % Final    MCV 07/14/2022 84.7  80.0 - 99.0 FL Final    MCH 07/14/2022 27.2  26.0 - 34.0 PG Final    MCHC 07/14/2022 32.1  30.0 - 36.5 g/dL Final    RDW 07/14/2022 13.3  11.5 - 14.5 % Final    PLATELET 64/19/1150 574  150 - 400 K/uL Final    MPV 07/14/2022 10.1  8.9 - 12.9 FL Final    NRBC 07/14/2022 0.0  0.0  WBC Final    ABSOLUTE NRBC 07/14/2022 0.00  0.00 - 0.01 K/uL Final    NEUTROPHILS 07/14/2022 63  32 - 75 % Final    LYMPHOCYTES 07/14/2022 30  12 - 49 % Final    MONOCYTES 07/14/2022 6  5 - 13 % Final    EOSINOPHILS 07/14/2022 1  0 - 7 % Final    BASOPHILS 07/14/2022 0  0 - 1 % Final    IMMATURE GRANULOCYTES 07/14/2022 0  0 - 0.5 % Final    ABS. NEUTROPHILS 07/14/2022 5.1  1.8 - 8.0 K/UL Final    ABS. LYMPHOCYTES 07/14/2022 2.4  0.8 - 3.5 K/UL Final    ABS. MONOCYTES 07/14/2022 0.5  0.0 - 1.0 K/UL Final    ABS. EOSINOPHILS 07/14/2022 0.1  0.0 - 0.4 K/UL Final    ABS. BASOPHILS 07/14/2022 0.0  0.0 - 0.1 K/UL Final    ABS. IMM.  GRANS. 07/14/2022 0.0  0.00 - 0.04 K/UL Final    DF 07/14/2022 AUTOMATED    Final    Sodium 07/14/2022 140  136 - 145 mmol/L Final    Potassium 07/14/2022 3.8  3.5 - 5.1 mmol/L Final    Chloride 07/14/2022 103  97 - 108 mmol/L Final    CO2 07/14/2022 28  21 - 32 mmol/L Final    Anion gap 07/14/2022 9  5 - 15 mmol/L Final    Glucose 07/14/2022 89  65 - 100 mg/dL Final    BUN 07/14/2022 14  6 - 20 MG/DL Final    Creatinine 07/14/2022 0.99  0.55 - 1.02 MG/DL Final    BUN/Creatinine ratio 07/14/2022 14  12 - 20   Final    GFR est AA 07/14/2022 >60  >60 ml/min/1.73m2 Final    GFR est non-AA 07/14/2022 >60  >60 ml/min/1.73m2 Final    Estimated GFR is calculated using the IDMS-traceable Modification of Diet in Renal Disease (MDRD) Study equation, reported for both  Americans (GFRAA) and non- Americans (GFRNA), and normalized to 1.73m2 body surface area. The physician must decide which value applies to the patient.  Calcium 07/14/2022 8.5  8.5 - 10.1 MG/DL Final    Bilirubin, total 07/14/2022 0.2  0.2 - 1.0 MG/DL Final    ALT (SGPT) 07/14/2022 25  12 - 78 U/L Final    AST (SGOT) 07/14/2022 16  15 - 37 U/L Final    Alk. phosphatase 07/14/2022 82  45 - 117 U/L Final    Protein, total 07/14/2022 7.4  6.4 - 8.2 g/dL Final    Albumin 07/14/2022 3.6  3.5 - 5.0 g/dL Final    Globulin 07/14/2022 3.8  2.0 - 4.0 g/dL Final    A-G Ratio 07/14/2022 0.9* 1.1 - 2.2   Final    Troponin-High Sensitivity 07/14/2022 47  0 - 51 ng/L Final    Comment: A HS troponin value change of (+ or -) 50% or more below the 99th percentile, in a 1/2/3 hr interval represents a significant change. Clinical correcation is recommended. A HS troponin value change of (+ or -) 20% or above the 99th percentile, in a 1/2/3 hr interval represents a significant change. Clinical correlation is recommended. 99th Percentile:   Women: 0-51 ng/L                                                                Men:   0-76 ng/L      Pregnancy test,urine (POC) 07/14/2022 Negative  NEG   Final    Pregnancy test,urine (POC) 07/14/2022 Negative  NEG   Final       IMAGING RESULTS:  XR CHEST PORT   Final Result   1.  No acute disease             MEDICATIONS GIVEN:  Medications - No data to display    PROGRESS NOTE:   3:08 PM Patient's symptoms have improved after treatment    CONSULTS:  none    IMPRESSION:  1.  Chest pain, unspecified type        PLAN:  - Discharge    Juju Phillips MD

## 2022-07-14 NOTE — ED TRIAGE NOTES
Pt c/o intermittent CP x 2-3 days, today pain has been an agrevating throb and constant, +nausea, +sob intermittent; pain substernal into right neck and between shoulder blades; pain 6/10 throbbing and constant; pt took excedrin around 1400 for headache as well;

## 2022-07-14 NOTE — ED NOTES
Verbal shift change report given to Bryan Chawla (oncoming nurse) by Kortney Byrd (offgoing nurse). Report included the following information SBAR, Kardex, MAR and Recent Results.

## 2022-07-15 LAB
ATRIAL RATE: 85 BPM
CALCULATED P AXIS, ECG09: 27 DEGREES
CALCULATED R AXIS, ECG10: 40 DEGREES
CALCULATED T AXIS, ECG11: 27 DEGREES
DIAGNOSIS, 93000: NORMAL
P-R INTERVAL, ECG05: 164 MS
Q-T INTERVAL, ECG07: 360 MS
QRS DURATION, ECG06: 84 MS
QTC CALCULATION (BEZET), ECG08: 428 MS
VENTRICULAR RATE, ECG03: 85 BPM

## 2022-12-20 ENCOUNTER — APPOINTMENT (OUTPATIENT)
Dept: GENERAL RADIOLOGY | Age: 42
End: 2022-12-20
Attending: STUDENT IN AN ORGANIZED HEALTH CARE EDUCATION/TRAINING PROGRAM
Payer: MEDICAID

## 2022-12-20 ENCOUNTER — HOSPITAL ENCOUNTER (EMERGENCY)
Age: 42
Discharge: HOME | End: 2022-12-20
Attending: STUDENT IN AN ORGANIZED HEALTH CARE EDUCATION/TRAINING PROGRAM
Payer: MEDICAID

## 2022-12-20 VITALS
TEMPERATURE: 100.1 F | OXYGEN SATURATION: 97 % | RESPIRATION RATE: 16 BRPM | DIASTOLIC BLOOD PRESSURE: 96 MMHG | HEIGHT: 59 IN | HEART RATE: 97 BPM | SYSTOLIC BLOOD PRESSURE: 148 MMHG | WEIGHT: 290.35 LBS | BODY MASS INDEX: 58.53 KG/M2

## 2022-12-20 DIAGNOSIS — J06.9 VIRAL UPPER RESPIRATORY TRACT INFECTION: Primary | ICD-10-CM

## 2022-12-20 DIAGNOSIS — R50.9 ACUTE FEBRILE ILLNESS: ICD-10-CM

## 2022-12-20 DIAGNOSIS — R05.1 ACUTE COUGH: ICD-10-CM

## 2022-12-20 LAB
FLUAV AG NPH QL IA: NEGATIVE
FLUBV AG NOSE QL IA: NEGATIVE

## 2022-12-20 PROCEDURE — U0005 INFEC AGEN DETEC AMPLI PROBE: HCPCS

## 2022-12-20 PROCEDURE — 99284 EMERGENCY DEPT VISIT MOD MDM: CPT

## 2022-12-20 PROCEDURE — 87804 INFLUENZA ASSAY W/OPTIC: CPT

## 2022-12-20 PROCEDURE — 74011250637 HC RX REV CODE- 250/637: Performed by: STUDENT IN AN ORGANIZED HEALTH CARE EDUCATION/TRAINING PROGRAM

## 2022-12-20 PROCEDURE — 71046 X-RAY EXAM CHEST 2 VIEWS: CPT

## 2022-12-20 RX ORDER — ACETAMINOPHEN 500 MG
1000 TABLET ORAL
Status: COMPLETED | OUTPATIENT
Start: 2022-12-20 | End: 2022-12-20

## 2022-12-20 RX ORDER — CHOLECALCIFEROL (VITAMIN D3) 125 MCG
2000 CAPSULE ORAL
COMMUNITY

## 2022-12-20 RX ORDER — BENZONATATE 100 MG/1
100 CAPSULE ORAL
Qty: 21 CAPSULE | Refills: 0 | Status: SHIPPED | OUTPATIENT
Start: 2022-12-20 | End: 2022-12-27

## 2022-12-20 RX ADMIN — ACETAMINOPHEN 1000 MG: 500 TABLET ORAL at 17:40

## 2022-12-20 NOTE — ED TRIAGE NOTES
Pt reports fever since Sunday. Tmax 102. Pt sts she has been taking naproxen. Pt reports cough and congestion. 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 Pt sts she is vaccinated against flu but not covid.

## 2022-12-20 NOTE — ED PROVIDER NOTES
Patient is 36 y.o. F presenting today with flulike symptoms.   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    Irregular menses     Migraine without aura and without status migrainosus, not intractable 8/1/2017    Plantar fasciitis        Past Surgical History:   Procedure Laterality Date    HX GYN      HX LAP CHOLECYSTECTOMY      HX ORTHOPAEDIC      HX TONSILLECTOMY           Family History:   Problem Relation Age of Onset    Heart Attack Mother     Stroke Mother     Hypertension Sister     High Cholesterol Sister     Diabetes Sister     Migraines Sister     Cancer Paternal Grandfather     HIV/AIDS Neg Hx        Social History     Socioeconomic History    Marital status: SINGLE     Spouse name: Not on file    Number of children: Not on file    Years of education: Not on file    Highest education level: Not on file   Occupational History    Not on file   Tobacco Use    Smoking status: Former     Years: 2.00     Types: Cigarettes     Quit date: 2/16/2007     Years since quitting: 15.8    Smokeless tobacco: Never   Vaping Use    Vaping Use: Never used   Substance and Sexual Activity    Alcohol use: No    Drug use: No    Sexual activity: Yes     Partners: Male   Other Topics Concern    Not on file   Social History Narrative    Not on file     Social Determinants of Health     Financial Resource Strain: Not on file   Food Insecurity: Not on file   Transportation Needs: Not on file   Physical Activity: Not on file   Stress: Not on file   Social Connections: Not on file   Intimate Partner Violence: Not on file   Housing Stability: Not on file         ALLERGIES: Gabapentin    Review of Systems   Constitutional:  Positive for chills, fatigue and fever. HENT:  Positive for congestion and rhinorrhea. Eyes:  Negative for redness and visual disturbance. Respiratory:  Positive for cough. Negative for shortness of breath. Cardiovascular:  Negative for chest pain and leg swelling. Gastrointestinal:  Negative for abdominal pain, diarrhea, nausea and vomiting. Genitourinary:  Negative for dysuria, flank pain, frequency, hematuria and urgency. Musculoskeletal:  Positive for myalgias. Negative for arthralgias, back pain and neck pain. Skin:  Negative for rash and wound. Allergic/Immunologic: Negative for immunocompromised state. Neurological:  Positive for headaches. Negative for dizziness. Vitals:    12/20/22 1701   Pulse: 97   Resp: 16   Temp: 100.1 °F (37.8 °C)   SpO2: 97%   Weight: 131.7 kg (290 lb 5.5 oz)   Height: 4' 11\" (1.499 m)            Physical Exam  Vitals and nursing note reviewed. Constitutional:       General: She is not in acute distress. Appearance: She is well-developed. She is obese. She is not diaphoretic. HENT:      Head: Normocephalic. Right Ear: Tympanic membrane normal.      Left Ear: Tympanic membrane normal.      Mouth/Throat:      Pharynx: No oropharyngeal exudate. Eyes:      General:         Right eye: No discharge. Left eye: No discharge. Pupils: Pupils are equal, round, and reactive to light. Cardiovascular:      Rate and Rhythm: Normal rate and regular rhythm. Heart sounds: Normal heart sounds. No murmur heard. No friction rub. No gallop. Pulmonary:      Effort: Pulmonary effort is normal. No respiratory distress. Breath sounds: Normal breath sounds. No stridor. No wheezing or rales. Comments: Frequent cough  Abdominal:      General: Bowel sounds are normal. There is no distension. Palpations: Abdomen is soft. Tenderness:  There is no abdominal tenderness. There is no guarding or rebound. Musculoskeletal:         General: No deformity. Normal range of motion. Cervical back: Normal range of motion and neck supple. Skin:     General: Skin is warm and dry. Capillary Refill: Capillary refill takes less than 2 seconds. Findings: No rash. Neurological:      Mental Status: She is alert and oriented to person, place, and time. Psychiatric:         Behavior: Behavior normal.        MDM     Amount and/or Complexity of Data Reviewed  Clinical lab tests: reviewed           Procedures          36 y.o. F presenting today with flulike symptoms. On exam patient is well-appearing and in no acute distress with stable vital signs. Lungs are clear to auscultation. CXR clear. No history or findings to suggest lower respiratory tract infection. No indication for Tamiflu at this time. Testing was performed and neg. Advised to stay home until symptoms improving in 24 hours of no fever without medications. Encouraged Tylenol, ibuprofen use with oral hydration. Covid testing sent. Tessalon for cough. ICD-10-CM ICD-9-CM    1. Viral upper respiratory tract infection  J06.9 465.9       2. Acute cough  R05.1 786.2       3.  Acute febrile illness  R50.9 780.60           Disposition: Discharge    Donavan Yarbrough DO

## 2022-12-20 NOTE — ED NOTES
Pt provided w/DC instructions re: Dx flu. New rx for benzonate sent to pt's pharm. Pt verbazlied understanding of DC instructions. Denied questions/concerns. Pt was well appearing, ambulatory on DC w/strong, steady gait.

## 2022-12-20 NOTE — Clinical Note
[address] EMERGENCY DEPT  [address] [facility]  [place] [address]  [phone]    Work/School Note    Date: 12/20/2022    To Whom It May concern:    Blue Goodrich was seen and treated today in the emergency room by the following provider(s):  Attending Provider: Brian Weston DO. Blue Goodrich is excused from work/school on 12/20/22 and 12/21/22. She is medically clear to return to work/school on 12/22/2022.        Sincerely,          Donavan Yarbrough DO

## 2022-12-21 LAB
SARS-COV-2, XPLCVT: NOT DETECTED
SOURCE, COVRS: NORMAL

## 2023-11-02 ENCOUNTER — HOSPITAL ENCOUNTER (EMERGENCY)
Facility: HOSPITAL | Age: 43
Discharge: HOME | End: 2023-11-02
Attending: EMERGENCY MEDICINE
Payer: MEDICAID

## 2023-11-02 VITALS
TEMPERATURE: 98.9 F | DIASTOLIC BLOOD PRESSURE: 74 MMHG | RESPIRATION RATE: 22 BRPM | BODY MASS INDEX: 54.43 KG/M2 | HEIGHT: 59 IN | HEART RATE: 79 BPM | SYSTOLIC BLOOD PRESSURE: 143 MMHG | WEIGHT: 270 LBS | OXYGEN SATURATION: 99 %

## 2023-11-02 DIAGNOSIS — M54.32 SCIATICA OF LEFT SIDE: Primary | ICD-10-CM

## 2023-11-02 PROCEDURE — 6360000002 HC RX W HCPCS: Performed by: EMERGENCY MEDICINE

## 2023-11-02 PROCEDURE — 99284 EMERGENCY DEPT VISIT MOD MDM: CPT

## 2023-11-02 PROCEDURE — 96372 THER/PROPH/DIAG INJ SC/IM: CPT

## 2023-11-02 RX ORDER — KETOROLAC TROMETHAMINE 30 MG/ML
30 INJECTION, SOLUTION INTRAMUSCULAR; INTRAVENOUS ONCE
Status: COMPLETED | OUTPATIENT
Start: 2023-11-02 | End: 2023-11-02

## 2023-11-02 RX ORDER — PREDNISONE 50 MG/1
50 TABLET ORAL DAILY
Qty: 5 TABLET | Refills: 0 | Status: SHIPPED | OUTPATIENT
Start: 2023-11-02 | End: 2023-11-07

## 2023-11-02 RX ORDER — NAPROXEN 500 MG/1
500 TABLET ORAL 2 TIMES DAILY
Qty: 30 TABLET | Refills: 0 | Status: SHIPPED | OUTPATIENT
Start: 2023-11-02

## 2023-11-02 RX ORDER — LIDOCAINE 50 MG/G
1 PATCH TOPICAL DAILY
Qty: 10 PATCH | Refills: 0 | Status: SHIPPED | OUTPATIENT
Start: 2023-11-02 | End: 2023-11-12

## 2023-11-02 RX ADMIN — KETOROLAC TROMETHAMINE 30 MG: 30 INJECTION, SOLUTION INTRAMUSCULAR; INTRAVENOUS at 18:02

## 2023-11-02 ASSESSMENT — PAIN DESCRIPTION - DESCRIPTORS: DESCRIPTORS: THROBBING;BURNING

## 2023-11-02 ASSESSMENT — PAIN DESCRIPTION - PAIN TYPE: TYPE: ACUTE PAIN

## 2023-11-02 ASSESSMENT — PAIN - FUNCTIONAL ASSESSMENT: PAIN_FUNCTIONAL_ASSESSMENT: 0-10

## 2023-11-02 ASSESSMENT — PAIN SCALES - GENERAL: PAINLEVEL_OUTOF10: 9

## 2023-11-02 ASSESSMENT — PAIN DESCRIPTION - ORIENTATION: ORIENTATION: LEFT

## 2023-11-02 ASSESSMENT — PAIN DESCRIPTION - LOCATION: LOCATION: LEG

## 2023-11-02 NOTE — ED PROVIDER NOTES
[facility] EMERGENCY DEPT  EMERGENCY DEPARTMENT ENCOUNTER      Pt Name: Brando Barillas  MRN: [id number]  [address] [facility] 1980  Date of evaluation: 11/2/2023  Provider: Belen Acosta MD    [address]       Chief Complaint   Patient presents with    Leg Pain     left         HISTORY OF PRESENT ILLNESS   (Location/Symptom, Timing/Onset, Context/Setting, Quality, Duration, Modifying Factors, Severity)  Note limiting factors. 26-year-old with a history of depression, fibromyalgia, lumbar disc disease, migraines. She presents with a 2-day history of left leg \"throbbing and burning. \"  She states the pain ranges from her left low back, into her buttocks, down her left lower extremity to her foot. It has been constant. She has tried ibuprofen with limited relief. She has had some nausea that she attributes to the pain. Her left knee hurts as well. She denies any injury or strenuous activity. She does work as a . She has a remote history of a \"herniated disc. \"  No numbness or tingling. No bowel or bladder incontinence. Review of External Medical Records:     Nursing Notes were reviewed. REVIEW OF SYSTEMS    (2-9 systems for level 4, 10 or more for level 5)     Review of Systems    Except as noted above the remainder of the review of systems was reviewed and negative.        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    Irregular menses     Migraine without aura and without status migrainosus, not intractable 8/1/2017    Plantar fasciitis          SURGICAL HISTORY       Past Surgical History:   Procedure Laterality Date    CHOLECYSTECTOMY, LAPAROSCOPIC      GYN      ORTHOPEDIC SURGERY      TONSILLECTOMY           CURRENT MEDICATIONS       Previous Medications    CHOLECALCIFEROL 50 MCG (2000 UT) TABS    Take 1 tablet by mouth       ALLERGIES     Gabapentin    FAMILY

## 2024-06-08 ENCOUNTER — HOSPITAL ENCOUNTER (EMERGENCY)
Facility: HOSPITAL | Age: 44
Discharge: HOME | End: 2024-06-08
Attending: EMERGENCY MEDICINE
Payer: MEDICAID

## 2024-06-08 ENCOUNTER — APPOINTMENT (OUTPATIENT)
Facility: HOSPITAL | Age: 44
End: 2024-06-08
Payer: MEDICAID

## 2024-06-08 VITALS
BODY MASS INDEX: 56.45 KG/M2 | HEIGHT: 59 IN | WEIGHT: 280 LBS | TEMPERATURE: 98.6 F | RESPIRATION RATE: 16 BRPM | SYSTOLIC BLOOD PRESSURE: 145 MMHG | OXYGEN SATURATION: 98 % | HEART RATE: 78 BPM | DIASTOLIC BLOOD PRESSURE: 67 MMHG

## 2024-06-08 DIAGNOSIS — R00.2 PALPITATIONS: Primary | ICD-10-CM

## 2024-06-08 DIAGNOSIS — R42 LIGHTHEADEDNESS: ICD-10-CM

## 2024-06-08 LAB
ALBUMIN SERPL-MCNC: 3.5 G/DL (ref 3.5–5)
ALBUMIN/GLOB SERPL: 0.9 (ref 1.1–2.2)
ALP SERPL-CCNC: 97 U/L (ref 45–117)
ALT SERPL-CCNC: 22 U/L (ref 12–78)
ANION GAP SERPL CALC-SCNC: 9 MMOL/L (ref 5–15)
APPEARANCE UR: CLEAR
AST SERPL-CCNC: 14 U/L (ref 15–37)
BACTERIA URNS QL MICRO: NEGATIVE /HPF
BASOPHILS # BLD: 0 K/UL (ref 0–0.1)
BASOPHILS NFR BLD: 0 % (ref 0–1)
BILIRUB SERPL-MCNC: 0.3 MG/DL (ref 0.2–1)
BILIRUB UR QL: NEGATIVE
BUN SERPL-MCNC: 14 MG/DL (ref 6–20)
BUN/CREAT SERPL: 19 (ref 12–20)
CALCIUM SERPL-MCNC: 8.2 MG/DL (ref 8.5–10.1)
CHLORIDE SERPL-SCNC: 102 MMOL/L (ref 97–108)
CO2 SERPL-SCNC: 26 MMOL/L (ref 21–32)
COLOR UR: NORMAL
COMMENT:: NORMAL
CREAT SERPL-MCNC: 0.72 MG/DL (ref 0.55–1.02)
DIFFERENTIAL METHOD BLD: NORMAL
EOSINOPHIL # BLD: 0.1 K/UL (ref 0–0.4)
EOSINOPHIL NFR BLD: 1 % (ref 0–7)
EPITH CASTS URNS QL MICRO: NORMAL /LPF
ERYTHROCYTE [DISTWIDTH] IN BLOOD BY AUTOMATED COUNT: 13.7 % (ref 11.5–14.5)
GLOBULIN SER CALC-MCNC: 4 G/DL (ref 2–4)
GLUCOSE SERPL-MCNC: 90 MG/DL (ref 65–100)
GLUCOSE UR STRIP.AUTO-MCNC: NEGATIVE MG/DL
HCG UR QL: NEGATIVE
HCT VFR BLD AUTO: 35.2 % (ref 35–47)
HGB BLD-MCNC: 11.5 G/DL (ref 11.5–16)
HGB UR QL STRIP: NEGATIVE
IMM GRANULOCYTES # BLD AUTO: 0 K/UL (ref 0–0.04)
IMM GRANULOCYTES NFR BLD AUTO: 0 % (ref 0–0.5)
KETONES UR QL STRIP.AUTO: NEGATIVE MG/DL
LEUKOCYTE ESTERASE UR QL STRIP.AUTO: NEGATIVE
LYMPHOCYTES # BLD: 2 K/UL (ref 0.8–3.5)
LYMPHOCYTES NFR BLD: 26 % (ref 12–49)
MCH RBC QN AUTO: 27.4 PG (ref 26–34)
MCHC RBC AUTO-ENTMCNC: 32.7 G/DL (ref 30–36.5)
MCV RBC AUTO: 84 FL (ref 80–99)
MONOCYTES # BLD: 0.4 K/UL (ref 0–1)
MONOCYTES NFR BLD: 5 % (ref 5–13)
NEUTS SEG # BLD: 5.3 K/UL (ref 1.8–8)
NEUTS SEG NFR BLD: 68 % (ref 32–75)
NITRITE UR QL STRIP.AUTO: NEGATIVE
NRBC # BLD: 0 K/UL (ref 0–0.01)
NRBC BLD-RTO: 0 PER 100 WBC
PH UR STRIP: 5.5 (ref 5–8)
PLATELET # BLD AUTO: 287 K/UL (ref 150–400)
PMV BLD AUTO: 9.8 FL (ref 8.9–12.9)
POTASSIUM SERPL-SCNC: 3.9 MMOL/L (ref 3.5–5.1)
PROT SERPL-MCNC: 7.5 G/DL (ref 6.4–8.2)
PROT UR STRIP-MCNC: NEGATIVE MG/DL
RBC # BLD AUTO: 4.19 M/UL (ref 3.8–5.2)
RBC #/AREA URNS HPF: NORMAL /HPF (ref 0–5)
SODIUM SERPL-SCNC: 137 MMOL/L (ref 136–145)
SP GR UR REFRACTOMETRY: 1.03 (ref 1–1.03)
SPECIMEN HOLD: NORMAL
URINE CULTURE IF INDICATED: NORMAL
UROBILINOGEN UR QL STRIP.AUTO: 0.2 EU/DL (ref 0.2–1)
WBC # BLD AUTO: 7.9 K/UL (ref 3.6–11)
WBC URNS QL MICRO: NORMAL /HPF (ref 0–4)

## 2024-06-08 PROCEDURE — 81001 URINALYSIS AUTO W/SCOPE: CPT

## 2024-06-08 PROCEDURE — 71046 X-RAY EXAM CHEST 2 VIEWS: CPT

## 2024-06-08 PROCEDURE — 93005 ELECTROCARDIOGRAM TRACING: CPT | Performed by: EMERGENCY MEDICINE

## 2024-06-08 PROCEDURE — 80053 COMPREHEN METABOLIC PANEL: CPT

## 2024-06-08 PROCEDURE — 85025 COMPLETE CBC W/AUTO DIFF WBC: CPT

## 2024-06-08 PROCEDURE — 99285 EMERGENCY DEPT VISIT HI MDM: CPT

## 2024-06-08 PROCEDURE — 36415 COLL VENOUS BLD VENIPUNCTURE: CPT

## 2024-06-08 PROCEDURE — 81025 URINE PREGNANCY TEST: CPT

## 2024-06-08 ASSESSMENT — ENCOUNTER SYMPTOMS
DIARRHEA: 0
ABDOMINAL PAIN: 0
BACK PAIN: 0
COLOR CHANGE: 0
CONSTIPATION: 0
SHORTNESS OF BREATH: 0
NAUSEA: 1
VOMITING: 0

## 2024-06-08 ASSESSMENT — PAIN - FUNCTIONAL ASSESSMENT: PAIN_FUNCTIONAL_ASSESSMENT: NONE - DENIES PAIN

## 2024-06-08 NOTE — ED PROVIDER NOTES
[facility] EMERGENCY DEPT  EMERGENCY DEPARTMENT ENCOUNTER      Pt Name: Caitlin Borges  MRN: [id number]  Birthdate 1980  Date of evaluation: 6/8/2024  Provider: Heriberto Carney MD    CHIEF COMPLAINT       Chief Complaint   Patient presents with    Dizziness    Palpitations         HISTORY OF PRESENT ILLNESS   (Location/Symptom, Timing/Onset, Context/Setting, Quality, Duration, Modifying Factors, Severity)  Note limiting factors.   Caitlin Borges is a 43 y.o. female who presents to the emergency department      The history is provided by the patient. No  was used.   Palpitations  Palpitations quality:  Fast  Onset quality:  Sudden  Progression:  Unchanged  Chronicity:  New  Ineffective treatments:  None tried  Associated symptoms: chest pain, dizziness, malaise/fatigue, nausea and near-syncope    Associated symptoms: no back pain, no numbness, no shortness of breath, no syncope, no vomiting and no weakness        Nursing Notes were reviewed.    REVIEW OF SYSTEMS    (2-9 systems for level 4, 10 or more for level 5)     Review of Systems   Constitutional:  Positive for malaise/fatigue. Negative for activity change, chills and fever.   HENT:  Negative for nosebleeds.    Eyes:  Negative for visual disturbance.   Respiratory:  Negative for shortness of breath.    Cardiovascular:  Positive for chest pain, palpitations and near-syncope. Negative for syncope.   Gastrointestinal:  Positive for nausea. Negative for abdominal pain, constipation, diarrhea and vomiting.   Genitourinary:  Negative for difficulty urinating, dysuria, hematuria and urgency.   Musculoskeletal:  Negative for back pain, neck pain and neck stiffness.   Skin:  Negative for color change.   Allergic/Immunologic: Negative for immunocompromised state.   Neurological:  Positive for dizziness. Negative for seizures, syncope, weakness, light-headedness, numbness and headaches.   Psychiatric/Behavioral:  Negative for behavioral problems, confusion,  findings:        Interpretation per the Radiologist below, if available at the time of this note:    XR CHEST (2 VW)   Final Result   Normal two view chest x-ray.         Electronically signed by DAJUAN ZAMORA            ED BEDSIDE ULTRASOUND:   Performed by ED Physician - none    LABS:  Labs Reviewed   COMPREHENSIVE METABOLIC PANEL - Abnormal; Notable for the following components:       Result Value    Calcium 8.2 (*)     AST 14 (*)     Albumin/Globulin Ratio 0.9 (*)     All other components within normal limits   CBC WITH AUTO DIFFERENTIAL   URINALYSIS WITH REFLEX TO CULTURE   EXTRA TUBES HOLD   POC PREGNANCY UR-QUAL   POC PREGNANCY UR-QUAL       All other labs were within normal range or not returned as of this dictation.    EMERGENCY DEPARTMENT COURSE and DIFFERENTIAL DIAGNOSIS/MDM:   Vitals:    Vitals:    06/08/24 1411   BP: (!) 145/67   Pulse: 78   Resp: 16   Temp: 98.6 °F (37 °C)   TempSrc: Oral   SpO2: 99%   Weight: 127 kg (280 lb)   Height: 1.499 m (4' 11\")           Medical Decision Making  Amount and/or Complexity of Data Reviewed  Labs: ordered.  Radiology: ordered.  ECG/medicine tests: ordered.    This is a 43-year-old female with past medical history, review of systems, physical exam as above, presenting with complaints of several days of intermittent episodes of palpitations, lightheadedness, nausea, malaise and fatigue.  She endorses occasional stabbing type chest pain.  She denies fevers, chills, vomiting, focal weakness or paresthesias, visual disturbance.  She denies a history of medical problems or daily medications.  Upon arrival today she is noted to be hypertensive, afebrile without tachycardia, satting well on room air.  She has clear breath sounds to auscultation, regular rate and rhythm, nonfocal neurologic exam.  Lab work and imaging obtained from triage without acute abnormality.  Discussed with patient at bedside she may be having intermittent arrhythmia, despite sinus rhythm here today,

## 2024-06-08 NOTE — ED TRIAGE NOTES
Pt arrives ambulatory to room with steady gait, pt reports for the last couple days has had episodes where she is having dizziness, lightheadedness and palpitations; pt reports comes and goes.

## 2024-06-08 NOTE — ED NOTES
Patient resting with eyes closed on stretcher, with visitor present at bedside. Call light within reach. Lights dimmed for comfort.

## 2024-06-08 NOTE — ED NOTES
Bedside and Verbal shift change report given to Nancy (oncoming nurse) by Yaz (offgoing nurse). Report included the following information Nurse Handoff Report, ED Encounter Summary, ED SBAR, MAR, and Recent Results.

## 2024-06-09 LAB
EKG ATRIAL RATE: 81 BPM
EKG DIAGNOSIS: NORMAL
EKG P AXIS: 30 DEGREES
EKG P-R INTERVAL: 162 MS
EKG Q-T INTERVAL: 374 MS
EKG QRS DURATION: 88 MS
EKG QTC CALCULATION (BAZETT): 434 MS
EKG R AXIS: 15 DEGREES
EKG T AXIS: 24 DEGREES
EKG VENTRICULAR RATE: 81 BPM

## 2024-06-09 PROCEDURE — 93010 ELECTROCARDIOGRAM REPORT: CPT | Performed by: SPECIALIST

## 2024-06-18 ENCOUNTER — HOSPITAL ENCOUNTER (EMERGENCY)
Facility: HOSPITAL | Age: 44
Discharge: HOME | End: 2024-06-18
Attending: EMERGENCY MEDICINE
Payer: MEDICAID

## 2024-06-18 VITALS
DIASTOLIC BLOOD PRESSURE: 53 MMHG | OXYGEN SATURATION: 99 % | RESPIRATION RATE: 18 BRPM | HEART RATE: 73 BPM | BODY MASS INDEX: 56.93 KG/M2 | TEMPERATURE: 98.3 F | HEIGHT: 60 IN | SYSTOLIC BLOOD PRESSURE: 122 MMHG | WEIGHT: 290 LBS

## 2024-06-18 DIAGNOSIS — M54.50 ACUTE LOW BACK PAIN WITHOUT SCIATICA, UNSPECIFIED BACK PAIN LATERALITY: ICD-10-CM

## 2024-06-18 DIAGNOSIS — S39.012A LUMBOSACRAL STRAIN, INITIAL ENCOUNTER: Primary | ICD-10-CM

## 2024-06-18 PROCEDURE — 99284 EMERGENCY DEPT VISIT MOD MDM: CPT

## 2024-06-18 PROCEDURE — 6360000002 HC RX W HCPCS: Performed by: EMERGENCY MEDICINE

## 2024-06-18 PROCEDURE — 96372 THER/PROPH/DIAG INJ SC/IM: CPT

## 2024-06-18 PROCEDURE — 6370000000 HC RX 637 (ALT 250 FOR IP): Performed by: EMERGENCY MEDICINE

## 2024-06-18 RX ORDER — KETOROLAC TROMETHAMINE 30 MG/ML
30 INJECTION, SOLUTION INTRAMUSCULAR; INTRAVENOUS
Status: COMPLETED | OUTPATIENT
Start: 2024-06-18 | End: 2024-06-18

## 2024-06-18 RX ORDER — METHOCARBAMOL 500 MG/1
1000 TABLET, FILM COATED ORAL
Status: COMPLETED | OUTPATIENT
Start: 2024-06-18 | End: 2024-06-18

## 2024-06-18 RX ORDER — METHOCARBAMOL 750 MG/1
750 TABLET, FILM COATED ORAL 4 TIMES DAILY PRN
Qty: 20 TABLET | Refills: 0 | Status: SHIPPED | OUTPATIENT
Start: 2024-06-18 | End: 2024-06-28

## 2024-06-18 RX ORDER — LIDOCAINE 50 MG/G
1 PATCH TOPICAL DAILY
Qty: 30 PATCH | Refills: 0 | Status: SHIPPED | OUTPATIENT
Start: 2024-06-18 | End: 2024-07-18

## 2024-06-18 RX ORDER — NAPROXEN 500 MG/1
500 TABLET ORAL 2 TIMES DAILY PRN
Qty: 20 TABLET | Refills: 0 | Status: SHIPPED | OUTPATIENT
Start: 2024-06-18

## 2024-06-18 RX ORDER — LIDOCAINE 4 G/G
1 PATCH TOPICAL
Status: DISCONTINUED | OUTPATIENT
Start: 2024-06-18 | End: 2024-06-18 | Stop reason: HOSPADM

## 2024-06-18 RX ADMIN — KETOROLAC TROMETHAMINE 30 MG: 30 INJECTION INTRAMUSCULAR; INTRAVENOUS at 09:54

## 2024-06-18 RX ADMIN — METHOCARBAMOL TABLETS 1000 MG: 500 TABLET, COATED ORAL at 09:55

## 2024-06-18 ASSESSMENT — PAIN DESCRIPTION - PAIN TYPE
TYPE: ACUTE PAIN
TYPE: ACUTE PAIN

## 2024-06-18 ASSESSMENT — PAIN DESCRIPTION - ONSET
ONSET: ON-GOING
ONSET: ON-GOING

## 2024-06-18 ASSESSMENT — PAIN - FUNCTIONAL ASSESSMENT
PAIN_FUNCTIONAL_ASSESSMENT: PREVENTS OR INTERFERES SOME ACTIVE ACTIVITIES AND ADLS
PAIN_FUNCTIONAL_ASSESSMENT: PREVENTS OR INTERFERES SOME ACTIVE ACTIVITIES AND ADLS

## 2024-06-18 ASSESSMENT — PAIN DESCRIPTION - ORIENTATION
ORIENTATION: LOWER

## 2024-06-18 ASSESSMENT — PAIN SCALES - GENERAL
PAINLEVEL_OUTOF10: 10

## 2024-06-18 ASSESSMENT — PAIN DESCRIPTION - DESCRIPTORS
DESCRIPTORS: THROBBING

## 2024-06-18 ASSESSMENT — PAIN DESCRIPTION - LOCATION
LOCATION: BACK

## 2024-06-18 ASSESSMENT — PAIN DESCRIPTION - FREQUENCY: FREQUENCY: CONTINUOUS

## 2024-06-18 NOTE — ED NOTES
Patient discharged to home at this time. All discharge instructions provided to patient. All questions answered. No distress noted at time of discharge. Ambulated without difficulty out of ED.

## 2024-06-18 NOTE — ED PROVIDER NOTES
[facility] EMERGENCY DEPT  EMERGENCY DEPARTMENT ENCOUNTER      Pt Name: Caitlin Borges  MRN: [id number]  Birthdate 1980  Date of evaluation: 6/18/2024  Provider: Smith Healy DO      HISTORY OF PRESENT ILLNESS      HPI  43-year-old female with a history of fibromyalgia, lumbar herniated disc, sciatica, presents to the emergency department stating that she was bending over in the shower yesterday to shave her legs and her back started hurting shooting through her back.  She denies any fall or trauma to the area.  She states that her low back is throbbing.  She denies any radiation of the pain down her lower extremities.  Denies any numbness, weakness, incontinence of bowel or bladder, urinary symptoms, or any other medical concerns.  She took a dose of Tylenol last night to no avail of her symptoms and she has not taken any medicine today.      Nursing Notes were reviewed.    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    Irregular menses     Migraine without aura and without status migrainosus, not intractable 8/1/2017    Plantar fasciitis          SURGICAL HISTORY       Past Surgical History:   Procedure Laterality Date    CHOLECYSTECTOMY, LAPAROSCOPIC      GYN      ORTHOPEDIC SURGERY      TONSILLECTOMY           CURRENT MEDICATIONS       Previous Medications    CHOLECALCIFEROL 50 MCG (2000 UT) TABS    Take 1 tablet by mouth    NAPROXEN (NAPROSYN) 500 MG TABLET    Take 1 tablet by mouth 2 times daily       ALLERGIES     Gabapentin    FAMILY HISTORY       Family History   Problem Relation Age of Onset    Heart Attack Mother     High Cholesterol Sister     Hypertension Sister     Stroke Mother     HIV/AIDS Neg Hx     Cancer Paternal Grandfather     Diabetes Sister     Migraines Sister           SOCIAL HISTORY       Social History

## 2024-06-18 NOTE — ED TRIAGE NOTES
Patient presents with lower back pain that started yesterday in the shower. Pt states she lifted her leg on the side of the tub to shave her legs and a pain shot through her back. Pt states since then her lower back has been throbbing. Rating pain 12/10. Pt states she took tylenol last night and no meds today.

## 2024-10-28 ENCOUNTER — APPOINTMENT (OUTPATIENT)
Facility: HOSPITAL | Age: 44
End: 2024-10-28
Payer: MEDICAID

## 2024-10-28 ENCOUNTER — HOSPITAL ENCOUNTER (EMERGENCY)
Facility: HOSPITAL | Age: 44
Discharge: HOME | End: 2024-10-28
Attending: STUDENT IN AN ORGANIZED HEALTH CARE EDUCATION/TRAINING PROGRAM
Payer: MEDICAID

## 2024-10-28 VITALS
OXYGEN SATURATION: 94 % | HEIGHT: 60 IN | WEIGHT: 290 LBS | TEMPERATURE: 100.1 F | DIASTOLIC BLOOD PRESSURE: 69 MMHG | BODY MASS INDEX: 56.93 KG/M2 | RESPIRATION RATE: 18 BRPM | SYSTOLIC BLOOD PRESSURE: 140 MMHG | HEART RATE: 102 BPM

## 2024-10-28 DIAGNOSIS — J02.0 STREP PHARYNGITIS: Primary | ICD-10-CM

## 2024-10-28 LAB
FLUAV RNA SPEC QL NAA+PROBE: NOT DETECTED
FLUBV RNA SPEC QL NAA+PROBE: NOT DETECTED
S PYO DNA THROAT QL NAA+PROBE: DETECTED
SARS-COV-2 RNA RESP QL NAA+PROBE: NOT DETECTED
SOURCE: NORMAL

## 2024-10-28 PROCEDURE — 71046 X-RAY EXAM CHEST 2 VIEWS: CPT

## 2024-10-28 PROCEDURE — 99284 EMERGENCY DEPT VISIT MOD MDM: CPT

## 2024-10-28 PROCEDURE — 87651 STREP A DNA AMP PROBE: CPT

## 2024-10-28 PROCEDURE — 87636 SARSCOV2 & INF A&B AMP PRB: CPT

## 2024-10-28 PROCEDURE — 6370000000 HC RX 637 (ALT 250 FOR IP): Performed by: STUDENT IN AN ORGANIZED HEALTH CARE EDUCATION/TRAINING PROGRAM

## 2024-10-28 RX ORDER — AMOXICILLIN 250 MG/1
500 CAPSULE ORAL
Status: COMPLETED | OUTPATIENT
Start: 2024-10-28 | End: 2024-10-28

## 2024-10-28 RX ORDER — AMOXICILLIN 500 MG/1
500 CAPSULE ORAL 2 TIMES DAILY
Qty: 20 CAPSULE | Refills: 0 | Status: SHIPPED | OUTPATIENT
Start: 2024-10-28 | End: 2024-11-07

## 2024-10-28 RX ORDER — IBUPROFEN 600 MG/1
600 TABLET, FILM COATED ORAL
Status: COMPLETED | OUTPATIENT
Start: 2024-10-28 | End: 2024-10-28

## 2024-10-28 RX ADMIN — IBUPROFEN 600 MG: 600 TABLET, FILM COATED ORAL at 20:28

## 2024-10-28 RX ADMIN — AMOXICILLIN 500 MG: 250 CAPSULE ORAL at 22:07

## 2024-10-28 ASSESSMENT — PAIN DESCRIPTION - PAIN TYPE: TYPE: ACUTE PAIN

## 2024-10-28 ASSESSMENT — PAIN SCALES - GENERAL
PAINLEVEL_OUTOF10: 6
PAINLEVEL_OUTOF10: 6

## 2024-10-28 ASSESSMENT — PAIN - FUNCTIONAL ASSESSMENT
PAIN_FUNCTIONAL_ASSESSMENT: PREVENTS OR INTERFERES SOME ACTIVE ACTIVITIES AND ADLS
PAIN_FUNCTIONAL_ASSESSMENT: 0-10

## 2024-10-28 ASSESSMENT — PAIN DESCRIPTION - LOCATION
LOCATION: THROAT
LOCATION: THROAT

## 2024-10-28 ASSESSMENT — PAIN DESCRIPTION - DESCRIPTORS
DESCRIPTORS: DISCOMFORT
DESCRIPTORS: DISCOMFORT

## 2024-10-29 NOTE — DISCHARGE INSTRUCTIONS
You were evaluated in the Emergency Department today for a sore throat. Your chest xray did not show evidence of a pneumonia- your cough is most likely due to a viral illness which will improve on its own with rest and fluids. You can take over the counter cold medications, Afrin (maximum of 3 days) for any nasal congestion or postnasal drip, Sucrets cough drops, and try drinking hot tea with honey or gargling salt water to help manage your symptoms.    Please schedule an appointment for follow up with your primary care physician as soon as possible.    Return to the Emergency Department if you experience fever 100.4 ° F or greater despite ibuprofen or acetaminophen, recurrent vomiting, chest pain, shortness of breath, palpitations, lightheadedness, dizziness or any other concerning symptoms.    Thank you for choosing us for your care.

## 2024-10-29 NOTE — ED PROVIDER NOTES
[facility] EMERGENCY DEPT  EMERGENCY DEPARTMENT ENCOUNTER      Pt Name: Caitlin Borges  MRN: [id number]  Birthdate 1980  Date of evaluation: 10/28/2024  Provider: Verito John MD    CHIEF COMPLAINT       Chief Complaint   Patient presents with    Fever    Cough    Nausea    Pharyngitis         HISTORY OF PRESENT ILLNESS    Nursing Triage Notes were reviewed.    HPI    Caitlin Borges is a 44 y.o. female with a history of depression, fibromyalgia, anemia, migraine, anxiety who presents to the emergency department for evaluation of fever, sore throat, and myalgias since yesterday.  Patient reports she has tried taking Tylenol today without relief.  Has not taken any other over-the-counter medications for any symptoms.  Has been tolerating p.o. intake and taking less than normal due to sore throat decreased appetite.  Complains of feeling very weak and fatigued.  Slept most of the day today and missed work.  Complains associated cough and chest discomfort with coughing.  Complains of some associated nausea without vomiting.  Denies any associated abdominal pain, shortness of breath, diarrhea.        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    Irregular menses     Migraine without aura and without status migrainosus, not intractable 8/1/2017    Plantar fasciitis          SURGICAL HISTORY       Past Surgical History:   Procedure Laterality Date    CHOLECYSTECTOMY, LAPAROSCOPIC      GYN      ORTHOPEDIC SURGERY      TONSILLECTOMY           CURRENT MEDICATIONS       Discharge Medication List as of 10/28/2024 10:17 PM        CONTINUE these medications which have NOT CHANGED    Details   naproxen (NAPROSYN) 500 MG tablet Take 1 tablet by mouth 2 times daily as needed for Pain, Disp-20 tablet, R-0Print      Cholecalciferol 50 MCG (2000 UT) TABS Take 1 tablet by mouthHistorical Med

## 2024-10-29 NOTE — ED TRIAGE NOTES
Pt complaining of fever, sore throat with body aches that start yesterday. Pt took tylenol today without relief. Pt axox4, clear speech, ambulatory to room with steady gait.

## 2025-01-23 ENCOUNTER — OFFICE VISIT (OUTPATIENT)
Age: 45
End: 2025-01-23

## 2025-01-23 VITALS
SYSTOLIC BLOOD PRESSURE: 142 MMHG | HEIGHT: 59 IN | BODY MASS INDEX: 58.87 KG/M2 | HEART RATE: 67 BPM | TEMPERATURE: 98.2 F | RESPIRATION RATE: 18 BRPM | WEIGHT: 292 LBS | DIASTOLIC BLOOD PRESSURE: 81 MMHG | OXYGEN SATURATION: 97 %

## 2025-01-23 DIAGNOSIS — M79.10 PAIN ON MOVEMENT OF SKELETAL MUSCLE: Primary | ICD-10-CM

## 2025-01-23 DIAGNOSIS — R03.0 ELEVATED BLOOD PRESSURE READING: ICD-10-CM

## 2025-01-23 RX ORDER — DICLOFENAC SODIUM 75 MG/1
75 TABLET, DELAYED RELEASE ORAL 2 TIMES DAILY
Qty: 30 TABLET | Refills: 0 | Status: SHIPPED | OUTPATIENT
Start: 2025-01-23 | End: 2025-02-07

## 2025-01-23 NOTE — PROGRESS NOTES
Caitlin Borges (:  1980) is a 44 y.o. female,New patient, here for evaluation of the following chief complaint(s):  Arm Pain (Left arm aching, unable to bend wrist and swelling up and down. And radiates to shoulder blade. Hard to  things. Started few days ago. Denies any injury. Patient is left handed. )      ASSESSMENT/PLAN:  Visit Diagnoses and Associated Orders       Pain on movement of skeletal muscle    -  Primary    diclofenac (VOLTAREN) 75 MG EC tablet [[id number]]           Elevated blood pressure reading                      Follow up with your PCP in 7 days or less and also talk to your PCP about your elevated blood pressure today.  Follow up in 7 days if symptoms persist or if symptoms worsen.    SUBJECTIVE/OBJECTIVE:    History provided by:  Patient   used: No    Arm Pain        44 y.o. female presents with symptoms of shoulder pain and wrist pain reports no injury. She endorses swelling in this arm when compared to the other arm, although this is hard to discern on exam.  strength is equal. ROM of the left shoulder is limited. She denies any injury or trauma.          Vitals:    25 1644   BP: (!) 142/81   Site: Left Upper Arm   Position: Sitting   Cuff Size: Large Adult   Pulse: 67   Resp: 18   Temp: 98.2 °F (36.8 °C)   TempSrc: Oral   SpO2: 97%   Weight: 132.5 kg (292 lb)   Height: 1.499 m (4' 11\")       Physical Exam  Vitals and nursing note reviewed.   Constitutional:       Appearance: Normal appearance.   HENT:      Head: Normocephalic and atraumatic.      Nose: Nose normal.      Mouth/Throat:      Mouth: Mucous membranes are moist.   Eyes:      Extraocular Movements: Extraocular movements intact.      Conjunctiva/sclera: Conjunctivae normal.      Pupils: Pupils are equal, round, and reactive to light.   Cardiovascular:      Rate and Rhythm: Normal rate and regular rhythm.   Pulmonary:      Effort: Pulmonary effort is normal.      Breath sounds: Normal breath

## 2025-02-26 ENCOUNTER — TELEPHONE (OUTPATIENT)
Dept: PRIMARY CARE CLINIC | Facility: CLINIC | Age: 45
End: 2025-02-26

## 2025-02-26 NOTE — TELEPHONE ENCOUNTER
----- Message from Yonis ANDRADE sent at 2/21/2025 10:49 AM EST -----  Regarding: [facility] Appointment Request  E[facility] Appointment Request    Patient needs appointment for E[facility] Appointment Type: New Patient.    Patient Requested Dates(s): Next week ( Monday, Wednesday, Thursday)  Patient Requested Time: afternoon  Provider Name:Tonya ArandaDO      Reason for Appointment Request: New Patient - Available appointments did not meet patient need  --------------------------------------------------------------------------------------------------------------------------    Relationship to Patient: Self     Call Back Information: OK to leave message on voicemail  Preferred Call Back Number: [phone]

## 2025-03-20 ENCOUNTER — OFFICE VISIT (OUTPATIENT)
Dept: PRIMARY CARE CLINIC | Facility: CLINIC | Age: 45
End: 2025-03-20
Payer: MEDICAID

## 2025-03-20 VITALS
RESPIRATION RATE: 16 BRPM | OXYGEN SATURATION: 98 % | TEMPERATURE: 98 F | HEIGHT: 59 IN | SYSTOLIC BLOOD PRESSURE: 165 MMHG | WEIGHT: 293 LBS | BODY MASS INDEX: 59.07 KG/M2 | HEART RATE: 79 BPM | DIASTOLIC BLOOD PRESSURE: 73 MMHG

## 2025-03-20 DIAGNOSIS — Z00.00 WELL ADULT EXAM: Primary | ICD-10-CM

## 2025-03-20 DIAGNOSIS — R20.0 NUMBNESS AND TINGLING: ICD-10-CM

## 2025-03-20 DIAGNOSIS — M25.512 CHRONIC LEFT SHOULDER PAIN: ICD-10-CM

## 2025-03-20 DIAGNOSIS — R20.2 NUMBNESS AND TINGLING: ICD-10-CM

## 2025-03-20 DIAGNOSIS — F33.1 DEPRESSION, MAJOR, RECURRENT, MODERATE (HCC): ICD-10-CM

## 2025-03-20 DIAGNOSIS — G89.29 CHRONIC LEFT SHOULDER PAIN: ICD-10-CM

## 2025-03-20 PROCEDURE — 99386 PREV VISIT NEW AGE 40-64: CPT | Performed by: STUDENT IN AN ORGANIZED HEALTH CARE EDUCATION/TRAINING PROGRAM

## 2025-03-20 SDOH — ECONOMIC STABILITY: FOOD INSECURITY: WITHIN THE PAST 12 MONTHS, YOU WORRIED THAT YOUR FOOD WOULD RUN OUT BEFORE YOU GOT MONEY TO BUY MORE.: NEVER TRUE

## 2025-03-20 SDOH — ECONOMIC STABILITY: FOOD INSECURITY: WITHIN THE PAST 12 MONTHS, THE FOOD YOU BOUGHT JUST DIDN'T LAST AND YOU DIDN'T HAVE MONEY TO GET MORE.: NEVER TRUE

## 2025-03-20 ASSESSMENT — PATIENT HEALTH QUESTIONNAIRE - PHQ9
SUM OF ALL RESPONSES TO PHQ QUESTIONS 1-9: 7
4. FEELING TIRED OR HAVING LITTLE ENERGY: NEARLY EVERY DAY
10. IF YOU CHECKED OFF ANY PROBLEMS, HOW DIFFICULT HAVE THESE PROBLEMS MADE IT FOR YOU TO DO YOUR WORK, TAKE CARE OF THINGS AT HOME, OR GET ALONG WITH OTHER PEOPLE: SOMEWHAT DIFFICULT
6. FEELING BAD ABOUT YOURSELF - OR THAT YOU ARE A FAILURE OR HAVE LET YOURSELF OR YOUR FAMILY DOWN: NOT AT ALL
9. THOUGHTS THAT YOU WOULD BE BETTER OFF DEAD, OR OF HURTING YOURSELF: NOT AT ALL
7. TROUBLE CONCENTRATING ON THINGS, SUCH AS READING THE NEWSPAPER OR WATCHING TELEVISION: NOT AT ALL
1. LITTLE INTEREST OR PLEASURE IN DOING THINGS: NOT AT ALL
2. FEELING DOWN, DEPRESSED OR HOPELESS: NOT AT ALL
5. POOR APPETITE OR OVEREATING: MORE THAN HALF THE DAYS
8. MOVING OR SPEAKING SO SLOWLY THAT OTHER PEOPLE COULD HAVE NOTICED. OR THE OPPOSITE, BEING SO FIGETY OR RESTLESS THAT YOU HAVE BEEN MOVING AROUND A LOT MORE THAN USUAL: NOT AT ALL
3. TROUBLE FALLING OR STAYING ASLEEP: MORE THAN HALF THE DAYS
SUM OF ALL RESPONSES TO PHQ QUESTIONS 1-9: 7

## 2025-03-20 NOTE — PROGRESS NOTES
Caitlin Borges (:  1980) is a 44 y.o. female,New patient, here for evaluation of the following chief complaint(s):  New Patient (Establishing care patient stated blood pressure been high 183/97 in that range fast heart rate . Pain in left shoulder [facility] said it was inflamed gave her medication for it mona fatigued and weight gain. Went to hospital for the chest pain said it was not heart issues to follow up with pcp)         Assessment & Plan  Well adult exam            Chronic left shoulder pain       Orders:    XR CERVICAL SPINE (2-3 VIEWS); Future    XR SHOULDER LEFT (MIN 2 VIEWS); Future    Numbness and tingling       Orders:    XR CERVICAL SPINE (2-3 VIEWS); Future    Depression, major, recurrent, moderate (HCC)    No SI/HI. Provided resources.    Orders:    CBC with Auto Differential    Comprehensive Metabolic Panel    Thyroid Cascade Profile    BMI 60.0-69.9, adult       Orders:    Vitamin D 25 Hydroxy    Hemoglobin A1C    Lipid Panel      Assessment & Plan  1. Left shoulder pain.  The patient reports significant pain in the left shoulder, which sometimes extends to the neck area, making it difficult to lift her arm and perform daily activities. She has been dealing with this pain for quite some time, and it has not improved with anti-inflammatories prescribed by urgent care. An x-ray of the left shoulder will be obtained to evaluate the cause of the pain. If the x-ray shows any abnormalities, an ultrasound and possibly an intra-articular steroid injection will be considered.    2. Cervical spine issues.  Due to the numbness and tingling reported, an x-ray of the cervical spine will be obtained to assess for any underlying issues that may be contributing to her symptoms.    3. Hypertension.  The patient reports that her blood pressure has been high lately. A comprehensive set of labs will be ordered today to evaluate her overall health status, including a check of her vitamin D levels

## 2025-03-20 NOTE — PROGRESS NOTES
\"Have you been to the ER, urgent care clinic since your last visit?  Hospitalized since your last visit?\"    NO    “Have you seen or consulted any other health care providers outside our system since your last visit?”    NO    Have you had a mammogram?”   NO    No breast cancer screening on file      “Have you had a pap smear?”    NO    No cervical cancer screening on file       “Have you had a colorectal cancer screening such as a colonoscopy/FIT/Cologuard?    NO    No colonoscopy on file  No cologuard on file  No FIT/FOBT on file   No flexible sigmoidoscopy on file     Chief Complaint   Patient presents with    New Patient     Establishing care patient stated blood pressure been high 183/97 in that range fast heart rate . Pain in left shoulder went to [facility] said it was inflamed gave her medication for it mona fatigued and weight gain. Went to hospital for the chest pain said it was not heart issues to follow up with pcp     BP (!) 165/73 (BP Site: Left Upper Arm, Patient Position: Sitting, BP Cuff Size: Large Adult)   Pulse 79   Temp 98 °F (36.7 °C) (Oral)   Resp 16   Ht 1.499 m (4' 11\")   Wt (!) 137 kg (302 lb)   SpO2 98%   BMI 61.00 kg/m²

## 2025-03-20 NOTE — PATIENT INSTRUCTIONS
IF YOU HAVE WORSENING THOUGHTS, EMOTIONS, OR THOUGHTS OF HURTING YOURSELF OR OTHERS CONTACT [phone] OR GO TO ER IMMEDIATELY  Recommend going to [facility]  Follow up in 4 weeks

## 2025-03-21 LAB
25(OH)D3+25(OH)D2 SERPL-MCNC: <4 NG/ML (ref 30–100)
ALBUMIN SERPL-MCNC: 3.9 G/DL (ref 3.9–4.9)
ALP SERPL-CCNC: 88 IU/L (ref 44–121)
ALT SERPL-CCNC: 16 IU/L (ref 0–32)
AST SERPL-CCNC: 15 IU/L (ref 0–40)
BASOPHILS # BLD AUTO: 0 X10E3/UL (ref 0–0.2)
BASOPHILS NFR BLD AUTO: 0 %
BILIRUB SERPL-MCNC: <0.2 MG/DL (ref 0–1.2)
BUN SERPL-MCNC: 9 MG/DL (ref 6–24)
BUN/CREAT SERPL: 17 (ref 9–23)
CALCIUM SERPL-MCNC: 8.5 MG/DL (ref 8.7–10.2)
CHLORIDE SERPL-SCNC: 104 MMOL/L (ref 96–106)
CHOLEST SERPL-MCNC: 161 MG/DL (ref 100–199)
CO2 SERPL-SCNC: 23 MMOL/L (ref 20–29)
CREAT SERPL-MCNC: 0.52 MG/DL (ref 0.57–1)
EGFRCR SERPLBLD CKD-EPI 2021: 117 ML/MIN/1.73
EOSINOPHIL # BLD AUTO: 0.1 X10E3/UL (ref 0–0.4)
EOSINOPHIL NFR BLD AUTO: 1 %
ERYTHROCYTE [DISTWIDTH] IN BLOOD BY AUTOMATED COUNT: 13 % (ref 11.7–15.4)
GLOBULIN SER CALC-MCNC: 2.4 G/DL (ref 1.5–4.5)
GLUCOSE SERPL-MCNC: 116 MG/DL (ref 70–99)
HBA1C MFR BLD: 5.9 % (ref 4.8–5.6)
HCT VFR BLD AUTO: 36.2 % (ref 34–46.6)
HDLC SERPL-MCNC: 54 MG/DL
HGB BLD-MCNC: 11.8 G/DL (ref 11.1–15.9)
IMM GRANULOCYTES # BLD AUTO: 0 X10E3/UL (ref 0–0.1)
IMM GRANULOCYTES NFR BLD AUTO: 0 %
LDLC SERPL CALC-MCNC: 77 MG/DL (ref 0–99)
LYMPHOCYTES # BLD AUTO: 1.6 X10E3/UL (ref 0.7–3.1)
LYMPHOCYTES NFR BLD AUTO: 31 %
MCH RBC QN AUTO: 27.8 PG (ref 26.6–33)
MCHC RBC AUTO-ENTMCNC: 32.6 G/DL (ref 31.5–35.7)
MCV RBC AUTO: 85 FL (ref 79–97)
MONOCYTES # BLD AUTO: 0.3 X10E3/UL (ref 0.1–0.9)
MONOCYTES NFR BLD AUTO: 6 %
NEUTROPHILS # BLD AUTO: 3.2 X10E3/UL (ref 1.4–7)
NEUTROPHILS NFR BLD AUTO: 62 %
PLATELET # BLD AUTO: 264 X10E3/UL (ref 150–450)
POTASSIUM SERPL-SCNC: 3.9 MMOL/L (ref 3.5–5.2)
PROT SERPL-MCNC: 6.3 G/DL (ref 6–8.5)
RBC # BLD AUTO: 4.25 X10E6/UL (ref 3.77–5.28)
SODIUM SERPL-SCNC: 140 MMOL/L (ref 134–144)
TRIGL SERPL-MCNC: 179 MG/DL (ref 0–149)
TSH SERPL DL<=0.005 MIU/L-ACNC: 1.77 UIU/ML (ref 0.45–4.5)
VLDLC SERPL CALC-MCNC: 30 MG/DL (ref 5–40)
WBC # BLD AUTO: 5.2 X10E3/UL (ref 3.4–10.8)

## 2025-03-21 NOTE — ASSESSMENT & PLAN NOTE
No SI/HI. Provided resources.    Orders:    CBC with Auto Differential    Comprehensive Metabolic Panel    Thyroid Cascade Profile

## 2025-04-17 ENCOUNTER — RESULTS FOLLOW-UP (OUTPATIENT)
Dept: PRIMARY CARE CLINIC | Facility: CLINIC | Age: 45
End: 2025-04-17

## 2025-04-17 DIAGNOSIS — E55.9 VITAMIN D DEFICIENCY: Primary | ICD-10-CM

## 2025-04-17 RX ORDER — ERGOCALCIFEROL 1.25 MG/1
50000 CAPSULE, LIQUID FILLED ORAL WEEKLY
Qty: 8 CAPSULE | Refills: 0 | Status: SHIPPED | OUTPATIENT
Start: 2025-04-17

## 2025-05-01 ENCOUNTER — OFFICE VISIT (OUTPATIENT)
Dept: PRIMARY CARE CLINIC | Facility: CLINIC | Age: 45
End: 2025-05-01

## 2025-05-01 VITALS
OXYGEN SATURATION: 97 % | HEART RATE: 71 BPM | HEIGHT: 59 IN | DIASTOLIC BLOOD PRESSURE: 81 MMHG | BODY MASS INDEX: 59.07 KG/M2 | SYSTOLIC BLOOD PRESSURE: 140 MMHG | WEIGHT: 293 LBS | TEMPERATURE: 97.8 F

## 2025-05-01 DIAGNOSIS — Z12.31 ENCOUNTER FOR SCREENING MAMMOGRAM FOR BREAST CANCER: ICD-10-CM

## 2025-05-01 DIAGNOSIS — F33.1 DEPRESSION, MAJOR, RECURRENT, MODERATE (HCC): ICD-10-CM

## 2025-05-01 DIAGNOSIS — E78.5 HYPERLIPIDEMIA, UNSPECIFIED HYPERLIPIDEMIA TYPE: ICD-10-CM

## 2025-05-01 DIAGNOSIS — E55.9 VITAMIN D DEFICIENCY: ICD-10-CM

## 2025-05-01 DIAGNOSIS — M79.7 FIBROMYALGIA: ICD-10-CM

## 2025-05-01 DIAGNOSIS — Z12.11 SCREENING FOR MALIGNANT NEOPLASM OF COLON: ICD-10-CM

## 2025-05-01 NOTE — ASSESSMENT & PLAN NOTE
Discussed different modalities of management including acupuncture, yoga, jensen chi.  Can continue medication as needed for flares as well.

## 2025-05-01 NOTE — ASSESSMENT & PLAN NOTE
Patient notes that she has not spoken with a therapist yet but is feeling okay overall.  Encouraged her to speak with the therapist.  Also discussed that pharmacological management can be used as needed.  Patient voiced understanding agreement.  Denied any active suicidal or homicidal ideation at this time.

## 2025-05-01 NOTE — PROGRESS NOTES
Caitlin Borges (:  1980) is a 44 y.o. female,Established patient, here for evaluation of the following chief complaint(s):  Follow-up (4 week f/u. Wants to discuss lab results and left leg and hip still bothering her. )         Assessment & Plan  BMI 60.0-69.9, adult (HCC)    Noted that patient has previously had issues with tachycardia in regards to other stimulants/weight loss medicines, decreasing the option of using oral medications such as Adipex.  Given BMI decision was made to refer patient to nonsurgical weight loss management team for assistance    Orders:    [facility] Sujey Laird MD, Bariatrics (non Surgical), [facility] (Paul In[facility])    Screening for malignant neoplasm of colon       Orders:    [facility] Jessica Tesfaye MD, Colorectal Surgery, [place] (Paul Ingram)    Encounter for screening mammogram for breast cancer       Orders:    [facility] HARJINDER DIGITAL SCREEN BILATERAL [[id number]]; Future    Fibromyalgia    Discussed different modalities of management including acupuncture, yoga, jensen chi.  Can continue medication as needed for flares as well.         Vitamin D deficiency    Will message after completing vitamin D course, will order recheck at that time         Hyperlipidemia, unspecified hyperlipidemia type    Mild, ASCVD score at 0.7%, recommended lifestyle management at this time with no medication indicated.         Depression, major, recurrent, moderate (HCC)    Patient notes that she has not spoken with a therapist yet but is feeling okay overall.  Encouraged her to speak with the therapist.  Also discussed that pharmacological management can be used as needed.  Patient voiced understanding agreement.  Denied any active suicidal or homicidal ideation at this time.           Assessment & Plan  1. Vitamin D deficiency.  - Vitamin D levels are significantly low, contributing to fatigue.  - Reports feeling less fatigued and more energetic after starting vitamin D supplements.  - Will

## 2025-05-01 NOTE — ASSESSMENT & PLAN NOTE
Noted that patient has previously had issues with tachycardia in regards to other stimulants/weight loss medicines, decreasing the option of using oral medications such as Adipex.  Given BMI decision was made to refer patient to nonsurgical weight loss management team for assistance    Orders:    Sujey Hickey MD, Bariatrics (non Surgical), [facility] (Paul Ingram)

## 2025-05-09 ENCOUNTER — TELEPHONE (OUTPATIENT)
Age: 45
End: 2025-05-09

## 2025-05-09 NOTE — TELEPHONE ENCOUNTER
Identified patient with two patient identifiers (name and ). Reviewed chart in preparation for encounter and have obtained necessary documentation.      Patient has been sent the link for our pre-recorded [facility] Orientation. Patient is required to register, view the recording, call insurance to verify benefits, then send an email to the  to schedule a consultation.

## 2025-05-15 ENCOUNTER — TELEPHONE (OUTPATIENT)
Age: 45
End: 2025-05-15

## 2025-05-15 ENCOUNTER — PREP FOR PROCEDURE (OUTPATIENT)
Age: 45
End: 2025-05-15

## 2025-05-15 DIAGNOSIS — Z12.11 SCREENING FOR COLON CANCER: ICD-10-CM

## 2025-05-15 NOTE — TELEPHONE ENCOUNTER
Contacted patient to schedule colonoscopy. Patient accepted 8/8. Patient thanked me for the call.

## 2025-05-22 ENCOUNTER — TELEPHONE (OUTPATIENT)
Age: 45
End: 2025-05-22

## 2025-05-22 NOTE — TELEPHONE ENCOUNTER
Called patient regarding referral to our [facility]. Patient did not answer so I left a voicemail with our contact information.

## 2025-05-29 ENCOUNTER — TELEPHONE (OUTPATIENT)
Age: 45
End: 2025-05-29

## 2025-05-29 RX ORDER — SODIUM, POTASSIUM,MAG SULFATES 17.5-3.13G
1 SOLUTION, RECONSTITUTED, ORAL ORAL SEE ADMIN INSTRUCTIONS
Qty: 1 KIT | Refills: 0 | Status: SHIPPED | OUTPATIENT
Start: 2025-05-29

## 2025-05-29 NOTE — TELEPHONE ENCOUNTER
----- Message from Farnaz GALVAN sent at 5/15/2025  1:48 PM EDT -----  Regarding: Colonoscopy  Patient scheduled for colonoscopy on 8/8  Will need prep/medication called in

## 2025-06-12 DIAGNOSIS — E55.9 VITAMIN D DEFICIENCY: ICD-10-CM

## 2025-06-14 PROBLEM — Z12.11 SCREENING FOR COLON CANCER: Status: RESOLVED | Noted: 2025-05-15 | Resolved: 2025-06-14

## 2025-06-16 ENCOUNTER — APPOINTMENT (OUTPATIENT)
Facility: HOSPITAL | Age: 45
End: 2025-06-16
Payer: MEDICAID

## 2025-06-16 ENCOUNTER — HOSPITAL ENCOUNTER (EMERGENCY)
Facility: HOSPITAL | Age: 45
Discharge: HOME | End: 2025-06-16
Attending: EMERGENCY MEDICINE
Payer: MEDICAID

## 2025-06-16 VITALS
TEMPERATURE: 98.1 F | OXYGEN SATURATION: 98 % | RESPIRATION RATE: 20 BRPM | DIASTOLIC BLOOD PRESSURE: 90 MMHG | HEIGHT: 59 IN | SYSTOLIC BLOOD PRESSURE: 163 MMHG | HEART RATE: 71 BPM | WEIGHT: 293 LBS | BODY MASS INDEX: 59.07 KG/M2

## 2025-06-16 DIAGNOSIS — S93.402A SPRAIN OF LEFT ANKLE, UNSPECIFIED LIGAMENT, INITIAL ENCOUNTER: Primary | ICD-10-CM

## 2025-06-16 PROCEDURE — 99283 EMERGENCY DEPT VISIT LOW MDM: CPT

## 2025-06-16 PROCEDURE — 73610 X-RAY EXAM OF ANKLE: CPT

## 2025-06-16 PROCEDURE — 73630 X-RAY EXAM OF FOOT: CPT

## 2025-06-16 PROCEDURE — 6370000000 HC RX 637 (ALT 250 FOR IP): Performed by: EMERGENCY MEDICINE

## 2025-06-16 RX ORDER — IBUPROFEN 600 MG/1
600 TABLET, FILM COATED ORAL
Status: COMPLETED | OUTPATIENT
Start: 2025-06-16 | End: 2025-06-16

## 2025-06-16 RX ORDER — LIDOCAINE 4 G/G
1 PATCH TOPICAL
Status: DISCONTINUED | OUTPATIENT
Start: 2025-06-16 | End: 2025-06-16 | Stop reason: HOSPADM

## 2025-06-16 RX ADMIN — IBUPROFEN 600 MG: 600 TABLET, FILM COATED ORAL at 12:02

## 2025-06-16 ASSESSMENT — PAIN SCALES - GENERAL
PAINLEVEL_OUTOF10: 8
PAINLEVEL_OUTOF10: 8

## 2025-06-16 ASSESSMENT — PAIN DESCRIPTION - ORIENTATION: ORIENTATION: LEFT

## 2025-06-16 ASSESSMENT — PAIN DESCRIPTION - ONSET: ONSET: SUDDEN

## 2025-06-16 ASSESSMENT — PAIN DESCRIPTION - FREQUENCY: FREQUENCY: CONTINUOUS

## 2025-06-16 ASSESSMENT — ENCOUNTER SYMPTOMS
ABDOMINAL PAIN: 0
COUGH: 0
SHORTNESS OF BREATH: 0

## 2025-06-16 ASSESSMENT — PAIN DESCRIPTION - DESCRIPTORS: DESCRIPTORS: ACHING

## 2025-06-16 ASSESSMENT — PAIN DESCRIPTION - LOCATION: LOCATION: FOOT;ANKLE

## 2025-06-16 ASSESSMENT — PAIN - FUNCTIONAL ASSESSMENT: PAIN_FUNCTIONAL_ASSESSMENT: ACTIVITIES ARE NOT PREVENTED

## 2025-06-16 ASSESSMENT — PAIN DESCRIPTION - PAIN TYPE: TYPE: ACUTE PAIN

## 2025-06-16 NOTE — ED NOTES
This RN attempting to discharge pt. Pt states ankle stirrup is not helping her foot pain. Attempting to speak with MD. MD aware.

## 2025-06-16 NOTE — ED TRIAGE NOTES
Pt wheeled to treatment area with visitors c/o left ankle and foot pain post injury. Pt states she \"twisted\" it last night.

## 2025-06-16 NOTE — ED PROVIDER NOTES
[facility]  EMERGENCY DEPARTMENT ENCOUNTER      Pt Name: Caitlin Borges  MRN: [id number]  Birthdate 1980  Date of evaluation: 6/16/2025  Provider: Barbara Harrison MD    CHIEF COMPLAINT       Chief Complaint   Patient presents with    Ankle Pain         HISTORY OF PRESENT ILLNESS    HPI    Caitlin Borges is a 44 y.o. female with PMH significant for obesity, fibromyalgia who presents to the emergency department left ankle and foot pain after trip over cat yesterday evening.  No pain medication taken prior to arrival.  Denies fall.  Denies injury elsewhere.  Denies any recent illness, fever, chills, nausea, vomiting, chest pain, shortness of breath.  Ambulatory without difficulty.  Nursing Notes were reviewed.    REVIEW OF SYSTEMS       Review of Systems   Constitutional:  Negative for fever.   Respiratory:  Negative for cough and shortness of breath.    Cardiovascular:  Negative for chest pain.   Gastrointestinal:  Negative for abdominal pain.   Musculoskeletal:  Positive for arthralgias. Negative for myalgias.   Skin:  Negative for wound.           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    Irregular menses     Migraine without aura and without status migrainosus, not intractable 8/1/2017    Plantar fasciitis          SURGICAL HISTORY       Past Surgical History:   Procedure Laterality Date    CHOLECYSTECTOMY, LAPAROSCOPIC      GYN      ORTHOPEDIC SURGERY      TONSILLECTOMY           CURRENT MEDICATIONS       Previous Medications    DICLOFENAC (VOLTAREN) 75 MG EC TABLET    Take 1 tablet by mouth 2 times daily for 15 days    SODIUM-POTASSIUM-MAG SULFATE (SUPREP) 17.5-3.13-1.6 GM/177ML SOLN SOLUTION    Take 354 mLs by mouth See Admin Instructions    VITAMIN D (ERGOCALCIFEROL) 1.25 MG (89952 UT) CAPS CAPSULE    Take 1 capsule by mouth once a week After 8 weeks, convert to

## 2025-06-16 NOTE — ED NOTES
The patient was discharged home by provider in stable condition with visitors. The patient is alert and oriented, in no respiratory distress. The patient's diagnosis, condition and treatment were explained. The patient expressed understanding and denies any questions or concerns at this time. Patient leaves treatment area via wheelchair with all personal belongings.

## 2025-06-17 DIAGNOSIS — E55.9 VITAMIN D DEFICIENCY: Primary | ICD-10-CM

## 2025-06-17 DIAGNOSIS — E55.9 VITAMIN D DEFICIENCY: ICD-10-CM

## 2025-06-17 RX ORDER — ERGOCALCIFEROL 1.25 MG/1
50000 CAPSULE, LIQUID FILLED ORAL WEEKLY
Qty: 4 CAPSULE | Refills: 1 | Status: SHIPPED | OUTPATIENT
Start: 2025-06-17

## 2025-06-18 ENCOUNTER — OFFICE VISIT (OUTPATIENT)
Age: 45
End: 2025-06-18
Payer: MEDICAID

## 2025-06-18 VITALS
HEART RATE: 77 BPM | OXYGEN SATURATION: 98 % | BODY MASS INDEX: 59.07 KG/M2 | DIASTOLIC BLOOD PRESSURE: 82 MMHG | RESPIRATION RATE: 16 BRPM | HEIGHT: 59 IN | WEIGHT: 293 LBS | SYSTOLIC BLOOD PRESSURE: 118 MMHG | TEMPERATURE: 98.2 F

## 2025-06-18 DIAGNOSIS — E66.813 CLASS 3 SEVERE OBESITY DUE TO EXCESS CALORIES WITH SERIOUS COMORBIDITY AND BODY MASS INDEX (BMI) OF 50.0 TO 59.9 IN ADULT (HCC): ICD-10-CM

## 2025-06-18 DIAGNOSIS — E78.00 HYPERCHOLESTEROLEMIA: ICD-10-CM

## 2025-06-18 DIAGNOSIS — R73.9 BLOOD GLUCOSE ELEVATED: ICD-10-CM

## 2025-06-18 DIAGNOSIS — E66.813 CLASS 3 SEVERE OBESITY DUE TO EXCESS CALORIES WITH SERIOUS COMORBIDITY AND BODY MASS INDEX (BMI) OF 50.0 TO 59.9 IN ADULT (HCC): Primary | ICD-10-CM

## 2025-06-18 DIAGNOSIS — R51.9 FREQUENT HEADACHES: ICD-10-CM

## 2025-06-18 DIAGNOSIS — G47.8 UNREFRESHED BY SLEEP: ICD-10-CM

## 2025-06-18 DIAGNOSIS — R40.0 DAYTIME SOMNOLENCE: ICD-10-CM

## 2025-06-18 PROCEDURE — 99214 OFFICE O/P EST MOD 30 MIN: CPT | Performed by: FAMILY MEDICINE

## 2025-06-18 ASSESSMENT — PATIENT HEALTH QUESTIONNAIRE - PHQ9
6. FEELING BAD ABOUT YOURSELF - OR THAT YOU ARE A FAILURE OR HAVE LET YOURSELF OR YOUR FAMILY DOWN: NOT AT ALL
2. FEELING DOWN, DEPRESSED OR HOPELESS: NOT AT ALL
SUM OF ALL RESPONSES TO PHQ QUESTIONS 1-9: 0
9. THOUGHTS THAT YOU WOULD BE BETTER OFF DEAD, OR OF HURTING YOURSELF: NOT AT ALL
8. MOVING OR SPEAKING SO SLOWLY THAT OTHER PEOPLE COULD HAVE NOTICED. OR THE OPPOSITE, BEING SO FIGETY OR RESTLESS THAT YOU HAVE BEEN MOVING AROUND A LOT MORE THAN USUAL: NOT AT ALL
SUM OF ALL RESPONSES TO PHQ QUESTIONS 1-9: 0
4. FEELING TIRED OR HAVING LITTLE ENERGY: NOT AT ALL
1. LITTLE INTEREST OR PLEASURE IN DOING THINGS: NOT AT ALL
3. TROUBLE FALLING OR STAYING ASLEEP: NOT AT ALL
5. POOR APPETITE OR OVEREATING: NOT AT ALL
SUM OF ALL RESPONSES TO PHQ QUESTIONS 1-9: 0
7. TROUBLE CONCENTRATING ON THINGS, SUCH AS READING THE NEWSPAPER OR WATCHING TELEVISION: NOT AT ALL
SUM OF ALL RESPONSES TO PHQ QUESTIONS 1-9: 0

## 2025-06-18 NOTE — PROGRESS NOTES
Identified pt with two pt identifiers (name and ). Reviewed chart in preparation for visit and have obtained necessary documentation.    Caitlin Borges is a 44 y.o. female  Chief Complaint   Patient presents with    New Patient     /82 (BP Site: Left Upper Arm, Patient Position: Sitting, BP Cuff Size: Large Adult)   Pulse 77   Temp 98.2 °F (36.8 °C) (Oral)   Resp 16   Ht 1.499 m (4' 11\")   Wt 134.6 kg (296 lb 12.8 oz)   LMP 2025 (Exact Date)   SpO2 98%   BMI 59.95 kg/m²     1. Have you been to the ER, urgent care clinic since your last visit?  Hospitalized since your last visit?yes - [facility] ER- sprained left foot.     2. Have you seen or consulted any other health care providers outside of the [facility] since your last visit?  Include any pap smears or colon screening. no

## 2025-06-18 NOTE — PROGRESS NOTES
[facility] Progress Note: Initial Physician Visit     Caitlin Borges is a 44 y.o. female who is here for medical screening for entering the [facility].   The patient denies any disease state that requires protein restriction.    CC: class 3 Obesity    Referred by pcp reason referred multiple joint pain      Starting weight 296 lbs    Weight History  I personally reviewed the Medical Screening [facility] completed by patient and scanned into media section of chart.  It includes duration of their obesity, maximum weight, goal weight  all of which give the context of their obesity AND a Family History of their obesity.        Was always been overweight  As an adult quique lowest was 200  Heaviest 296  Parents and sibs are not overweight        Weight loss History  I personally reviewed the Medical Screening [facility] completed by the patient and scanned into media section of chart.  It includes number of weight loss attempts, the weight loss program that patients was most successful using, and if they have any hx of anorectic medication use, including OTC supplements.       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

## 2025-07-15 ENCOUNTER — TELEMEDICINE (OUTPATIENT)
Age: 45
End: 2025-07-15
Payer: MEDICAID

## 2025-07-15 DIAGNOSIS — E66.813 CLASS 3 SEVERE OBESITY DUE TO EXCESS CALORIES WITH SERIOUS COMORBIDITY AND BODY MASS INDEX (BMI) OF 50.0 TO 59.9 IN ADULT (HCC): Primary | ICD-10-CM

## 2025-07-15 PROCEDURE — 97802 MEDICAL NUTRITION INDIV IN: CPT | Performed by: DIETITIAN, REGISTERED

## 2025-07-15 NOTE — PROGRESS NOTES
[facility]  [address]  [place] [address]  Phone: [phone] Fax: [phone]   Nutrition Assessment - Medical Nutrition Therapy   Initial Evaluation         Patient Name: Caitlin Borges : 1980   Treatment Diagnosis: Obesity Class 3   Referral Source: Sujey Delgado MD Start of Care (SOC): 7/15/2025     In time:   3:02pm         Out time:   3:25pm   Total Treatment Time (min):   23 min     Consent:  Patient and/or their healthcare decision maker is aware that this patient-initiated Telehealth encounter is a billable service, with coverage as determined by her insurance carrier. They are aware that they may receive a bill and has provided verbal consent to proceed: yes, confirmed      Caitlin Borges, was evaluated through a synchronous (real-time) audio-video encounter. The patient (or guardian if applicable) is aware that this is a billable service, which includes applicable co-pays. This Virtual Visit was conducted with patient's (and/or legal guardian's) consent. Patient identification was verified, and a caregiver was present when appropriate.   The patient was located at Home: [id number] [place] [id number]  Provider was located at Home (not Appt Dept State): [place]  Confirm you are appropriately licensed, registered, or certified to deliver care in the state where the patient is located as indicated above. If you are not or unsure, please re-schedule the visit: Yes, I confirm.      Total time spent for this encounter: 23 min    --GILMAR CLEMENS RD on 7/15/2025 at 3:02 PM    An electronic signature was used to authenticate this note.    Gender: female Age: 44 y.o.   Ht: 59 in Wt:  296 lb 134 kg   BMI: 59 RMR   Male  RMR Female 1893   Anthropometrics Assessment: Per BMI, pt is considered obese.      Past Medical History includes: Fibromyalgia, depression, anxiety, Pre-DM, High TG     Pertinent Medications that may contribute to

## 2025-07-24 PROBLEM — Z12.11 SCREENING FOR COLON CANCER: Status: ACTIVE | Noted: 2025-05-15

## 2025-08-18 ENCOUNTER — OFFICE VISIT (OUTPATIENT)
Age: 45
End: 2025-08-18
Payer: MEDICAID

## 2025-08-18 VITALS
BODY MASS INDEX: 59.07 KG/M2 | SYSTOLIC BLOOD PRESSURE: 138 MMHG | RESPIRATION RATE: 18 BRPM | WEIGHT: 293 LBS | DIASTOLIC BLOOD PRESSURE: 78 MMHG | HEIGHT: 59 IN | HEART RATE: 89 BPM | TEMPERATURE: 97.7 F | OXYGEN SATURATION: 96 %

## 2025-08-18 DIAGNOSIS — R73.9 BLOOD GLUCOSE ELEVATED: ICD-10-CM

## 2025-08-18 DIAGNOSIS — E78.00 HYPERCHOLESTEROLEMIA: ICD-10-CM

## 2025-08-18 DIAGNOSIS — G47.8 UNREFRESHED BY SLEEP: ICD-10-CM

## 2025-08-18 DIAGNOSIS — E66.813 CLASS 3 SEVERE OBESITY DUE TO EXCESS CALORIES WITH SERIOUS COMORBIDITY AND BODY MASS INDEX (BMI) OF 50.0 TO 59.9 IN ADULT (HCC): Primary | ICD-10-CM

## 2025-08-18 PROCEDURE — 99214 OFFICE O/P EST MOD 30 MIN: CPT | Performed by: FAMILY MEDICINE

## 2025-08-18 RX ORDER — BUPROPION HYDROCHLORIDE 150 MG/1
150 TABLET, EXTENDED RELEASE ORAL 2 TIMES DAILY
Qty: 60 TABLET | Refills: 3 | Status: SHIPPED | OUTPATIENT
Start: 2025-08-18

## 2025-08-18 ASSESSMENT — PATIENT HEALTH QUESTIONNAIRE - PHQ9
SUM OF ALL RESPONSES TO PHQ QUESTIONS 1-9: 0
1. LITTLE INTEREST OR PLEASURE IN DOING THINGS: NOT AT ALL
2. FEELING DOWN, DEPRESSED OR HOPELESS: NOT AT ALL

## 2025-08-23 PROBLEM — Z12.11 SCREENING FOR COLON CANCER: Status: RESOLVED | Noted: 2025-05-15 | Resolved: 2025-08-23

## 2025-08-26 ENCOUNTER — TELEMEDICINE (OUTPATIENT)
Age: 45
End: 2025-08-26

## 2025-08-26 ENCOUNTER — TRANSCRIBE ORDERS (OUTPATIENT)
Facility: HOSPITAL | Age: 45
End: 2025-08-26

## 2025-08-26 DIAGNOSIS — M72.2 PLANTAR FASCIAL FIBROMATOSIS: Primary | ICD-10-CM

## 2025-08-26 DIAGNOSIS — E66.813 CLASS 3 SEVERE OBESITY DUE TO EXCESS CALORIES WITH SERIOUS COMORBIDITY AND BODY MASS INDEX (BMI) OF 50.0 TO 59.9 IN ADULT (HCC): Primary | ICD-10-CM

## 2025-09-04 ENCOUNTER — OFFICE VISIT (OUTPATIENT)
Dept: PRIMARY CARE CLINIC | Facility: CLINIC | Age: 45
End: 2025-09-04
Payer: MEDICAID

## 2025-09-04 VITALS
DIASTOLIC BLOOD PRESSURE: 78 MMHG | HEIGHT: 59 IN | OXYGEN SATURATION: 98 % | WEIGHT: 293 LBS | SYSTOLIC BLOOD PRESSURE: 128 MMHG | RESPIRATION RATE: 16 BRPM | HEART RATE: 79 BPM | BODY MASS INDEX: 59.07 KG/M2

## 2025-09-04 DIAGNOSIS — E78.5 HYPERLIPIDEMIA, UNSPECIFIED HYPERLIPIDEMIA TYPE: ICD-10-CM

## 2025-09-04 DIAGNOSIS — M25.552 PAIN OF LEFT HIP: ICD-10-CM

## 2025-09-04 DIAGNOSIS — E55.9 VITAMIN D DEFICIENCY: ICD-10-CM

## 2025-09-04 PROCEDURE — 99214 OFFICE O/P EST MOD 30 MIN: CPT | Performed by: STUDENT IN AN ORGANIZED HEALTH CARE EDUCATION/TRAINING PROGRAM

## 2025-09-04 PROCEDURE — G2211 COMPLEX E/M VISIT ADD ON: HCPCS | Performed by: STUDENT IN AN ORGANIZED HEALTH CARE EDUCATION/TRAINING PROGRAM

## 2025-09-04 RX ORDER — MELOXICAM 15 MG/1
15 TABLET ORAL DAILY PRN
Qty: 30 TABLET | Refills: 0 | Status: SHIPPED | OUTPATIENT
Start: 2025-09-04

## 2025-09-05 LAB
25(OH)D3+25(OH)D2 SERPL-MCNC: 11.7 NG/ML (ref 30–100)
ALBUMIN SERPL-MCNC: 4 G/DL (ref 3.9–4.9)
ALP SERPL-CCNC: 81 IU/L (ref 44–121)
ALT SERPL-CCNC: 16 IU/L (ref 0–32)
AST SERPL-CCNC: 16 IU/L (ref 0–40)
BASOPHILS # BLD AUTO: 0 X10E3/UL (ref 0–0.2)
BASOPHILS NFR BLD AUTO: 1 %
BILIRUB SERPL-MCNC: 0.2 MG/DL (ref 0–1.2)
BUN SERPL-MCNC: 10 MG/DL (ref 6–24)
BUN/CREAT SERPL: 19 (ref 9–23)
CALCIUM SERPL-MCNC: 8.7 MG/DL (ref 8.7–10.2)
CHLORIDE SERPL-SCNC: 102 MMOL/L (ref 96–106)
CHOLEST SERPL-MCNC: 183 MG/DL (ref 100–199)
CO2 SERPL-SCNC: 23 MMOL/L (ref 20–29)
CREAT SERPL-MCNC: 0.52 MG/DL (ref 0.57–1)
EGFRCR SERPLBLD CKD-EPI 2021: 116 ML/MIN/1.73
EOSINOPHIL # BLD AUTO: 0.1 X10E3/UL (ref 0–0.4)
EOSINOPHIL NFR BLD AUTO: 2 %
ERYTHROCYTE [DISTWIDTH] IN BLOOD BY AUTOMATED COUNT: 13.7 % (ref 11.7–15.4)
EST. AVERAGE GLUCOSE BLD GHB EST-MCNC: 117 MG/DL
GLOBULIN SER CALC-MCNC: 2.6 G/DL (ref 1.5–4.5)
GLUCOSE SERPL-MCNC: 84 MG/DL (ref 70–99)
HBA1C MFR BLD: 5.7 % (ref 4.8–5.6)
HCT VFR BLD AUTO: 36.6 % (ref 34–46.6)
HDLC SERPL-MCNC: 56 MG/DL
HGB BLD-MCNC: 11.5 G/DL (ref 11.1–15.9)
IMM GRANULOCYTES # BLD AUTO: 0 X10E3/UL (ref 0–0.1)
IMM GRANULOCYTES NFR BLD AUTO: 0 %
LDLC SERPL CALC-MCNC: 109 MG/DL (ref 0–99)
LYMPHOCYTES # BLD AUTO: 1.6 X10E3/UL (ref 0.7–3.1)
LYMPHOCYTES NFR BLD AUTO: 37 %
MCH RBC QN AUTO: 27.8 PG (ref 26.6–33)
MCHC RBC AUTO-ENTMCNC: 31.4 G/DL (ref 31.5–35.7)
MCV RBC AUTO: 89 FL (ref 79–97)
MONOCYTES # BLD AUTO: 0.3 X10E3/UL (ref 0.1–0.9)
MONOCYTES NFR BLD AUTO: 8 %
NEUTROPHILS # BLD AUTO: 2.3 X10E3/UL (ref 1.4–7)
NEUTROPHILS NFR BLD AUTO: 52 %
PLATELET # BLD AUTO: 271 X10E3/UL (ref 150–450)
POTASSIUM SERPL-SCNC: 4.5 MMOL/L (ref 3.5–5.2)
PROT SERPL-MCNC: 6.6 G/DL (ref 6–8.5)
RBC # BLD AUTO: 4.13 X10E6/UL (ref 3.77–5.28)
SODIUM SERPL-SCNC: 138 MMOL/L (ref 134–144)
TRIGL SERPL-MCNC: 97 MG/DL (ref 0–149)
VLDLC SERPL CALC-MCNC: 18 MG/DL (ref 5–40)
WBC # BLD AUTO: 4.3 X10E3/UL (ref 3.4–10.8)